# Patient Record
Sex: FEMALE | Race: OTHER | HISPANIC OR LATINO
[De-identification: names, ages, dates, MRNs, and addresses within clinical notes are randomized per-mention and may not be internally consistent; named-entity substitution may affect disease eponyms.]

---

## 2018-12-10 ENCOUNTER — FORM ENCOUNTER (OUTPATIENT)
Age: 63
End: 2018-12-10

## 2018-12-11 ENCOUNTER — OUTPATIENT (OUTPATIENT)
Dept: OUTPATIENT SERVICES | Facility: HOSPITAL | Age: 63
LOS: 1 days | End: 2018-12-11
Payer: COMMERCIAL

## 2018-12-11 ENCOUNTER — APPOINTMENT (OUTPATIENT)
Dept: ORTHOPEDIC SURGERY | Facility: CLINIC | Age: 63
End: 2018-12-11
Payer: MEDICAID

## 2018-12-11 VITALS — OXYGEN SATURATION: 98 % | BODY MASS INDEX: 30.2 KG/M2 | WEIGHT: 140 LBS | HEIGHT: 57 IN | HEART RATE: 72 BPM

## 2018-12-11 VITALS — DIASTOLIC BLOOD PRESSURE: 70 MMHG | SYSTOLIC BLOOD PRESSURE: 124 MMHG

## 2018-12-11 PROCEDURE — 99203 OFFICE O/P NEW LOW 30 MIN: CPT

## 2018-12-11 PROCEDURE — 73564 X-RAY EXAM KNEE 4 OR MORE: CPT | Mod: 26,LT,RT

## 2018-12-11 PROCEDURE — 73502 X-RAY EXAM HIP UNI 2-3 VIEWS: CPT | Mod: 26,LT

## 2018-12-11 PROCEDURE — 72020 X-RAY EXAM OF SPINE 1 VIEW: CPT | Mod: 26

## 2018-12-11 PROCEDURE — 72020 X-RAY EXAM OF SPINE 1 VIEW: CPT

## 2018-12-11 PROCEDURE — 73502 X-RAY EXAM HIP UNI 2-3 VIEWS: CPT

## 2018-12-11 PROCEDURE — 73564 X-RAY EXAM KNEE 4 OR MORE: CPT

## 2019-02-19 ENCOUNTER — FORM ENCOUNTER (OUTPATIENT)
Age: 64
End: 2019-02-19

## 2019-02-20 ENCOUNTER — OUTPATIENT (OUTPATIENT)
Dept: OUTPATIENT SERVICES | Facility: HOSPITAL | Age: 64
LOS: 1 days | End: 2019-02-20
Payer: MEDICAID

## 2019-02-20 ENCOUNTER — OUTPATIENT (OUTPATIENT)
Dept: OUTPATIENT SERVICES | Facility: HOSPITAL | Age: 64
LOS: 1 days | End: 2019-02-20
Payer: COMMERCIAL

## 2019-02-20 DIAGNOSIS — Z22.321 CARRIER OR SUSPECTED CARRIER OF METHICILLIN SUSCEPTIBLE STAPHYLOCOCCUS AUREUS: ICD-10-CM

## 2019-02-20 DIAGNOSIS — M16.12 UNILATERAL PRIMARY OSTEOARTHRITIS, LEFT HIP: ICD-10-CM

## 2019-02-20 LAB
ANION GAP SERPL CALC-SCNC: 12 MMOL/L — SIGNIFICANT CHANGE UP (ref 5–17)
APPEARANCE UR: ABNORMAL
APTT BLD: 30.3 SEC — SIGNIFICANT CHANGE UP (ref 27.5–36.3)
BACTERIA # UR AUTO: PRESENT /HPF
BILIRUB UR-MCNC: NEGATIVE — SIGNIFICANT CHANGE UP
BUN SERPL-MCNC: 18 MG/DL — SIGNIFICANT CHANGE UP (ref 7–23)
CALCIUM SERPL-MCNC: 10.4 MG/DL — SIGNIFICANT CHANGE UP (ref 8.4–10.5)
CHLORIDE SERPL-SCNC: 98 MMOL/L — SIGNIFICANT CHANGE UP (ref 96–108)
CO2 SERPL-SCNC: 28 MMOL/L — SIGNIFICANT CHANGE UP (ref 22–31)
COLOR SPEC: YELLOW — SIGNIFICANT CHANGE UP
CREAT SERPL-MCNC: 1.03 MG/DL — SIGNIFICANT CHANGE UP (ref 0.5–1.3)
DIFF PNL FLD: ABNORMAL
EPI CELLS # UR: SIGNIFICANT CHANGE UP /HPF (ref 0–5)
GLUCOSE SERPL-MCNC: 80 MG/DL — SIGNIFICANT CHANGE UP (ref 70–99)
GLUCOSE UR QL: NEGATIVE — SIGNIFICANT CHANGE UP
HBA1C BLD-MCNC: 5.5 % — SIGNIFICANT CHANGE UP (ref 4–5.6)
HCT VFR BLD CALC: 35.8 % — SIGNIFICANT CHANGE UP (ref 34.5–45)
HGB BLD-MCNC: 11.2 G/DL — LOW (ref 11.5–15.5)
INR BLD: 1.04 — SIGNIFICANT CHANGE UP (ref 0.88–1.16)
KETONES UR-MCNC: 15 MG/DL
LEUKOCYTE ESTERASE UR-ACNC: ABNORMAL
MCHC RBC-ENTMCNC: 30.7 PG — SIGNIFICANT CHANGE UP (ref 27–34)
MCHC RBC-ENTMCNC: 31.3 GM/DL — LOW (ref 32–36)
MCV RBC AUTO: 98.1 FL — SIGNIFICANT CHANGE UP (ref 80–100)
NITRITE UR-MCNC: POSITIVE
NRBC # BLD: 0 /100 WBCS — SIGNIFICANT CHANGE UP (ref 0–0)
PH UR: 6 — SIGNIFICANT CHANGE UP (ref 5–8)
PLATELET # BLD AUTO: 295 K/UL — SIGNIFICANT CHANGE UP (ref 150–400)
POTASSIUM SERPL-MCNC: 4 MMOL/L — SIGNIFICANT CHANGE UP (ref 3.5–5.3)
POTASSIUM SERPL-SCNC: 4 MMOL/L — SIGNIFICANT CHANGE UP (ref 3.5–5.3)
PROT UR-MCNC: 30 MG/DL
PROTHROM AB SERPL-ACNC: 11.8 SEC — SIGNIFICANT CHANGE UP (ref 10–12.9)
RBC # BLD: 3.65 M/UL — LOW (ref 3.8–5.2)
RBC # FLD: 13.4 % — SIGNIFICANT CHANGE UP (ref 10.3–14.5)
RBC CASTS # UR COMP ASSIST: ABNORMAL /HPF
SODIUM SERPL-SCNC: 138 MMOL/L — SIGNIFICANT CHANGE UP (ref 135–145)
SP GR SPEC: 1.01 — SIGNIFICANT CHANGE UP (ref 1–1.03)
UROBILINOGEN FLD QL: 0.2 E.U./DL — SIGNIFICANT CHANGE UP
WBC # BLD: 8.23 K/UL — SIGNIFICANT CHANGE UP (ref 3.8–10.5)
WBC # FLD AUTO: 8.23 K/UL — SIGNIFICANT CHANGE UP (ref 3.8–10.5)
WBC UR QL: ABNORMAL /HPF

## 2019-02-20 PROCEDURE — 93005 ELECTROCARDIOGRAM TRACING: CPT

## 2019-02-20 PROCEDURE — 93010 ELECTROCARDIOGRAM REPORT: CPT

## 2019-02-20 PROCEDURE — 85610 PROTHROMBIN TIME: CPT

## 2019-02-20 PROCEDURE — 83036 HEMOGLOBIN GLYCOSYLATED A1C: CPT

## 2019-02-20 PROCEDURE — 85027 COMPLETE CBC AUTOMATED: CPT

## 2019-02-20 PROCEDURE — 85730 THROMBOPLASTIN TIME PARTIAL: CPT

## 2019-02-20 PROCEDURE — 71046 X-RAY EXAM CHEST 2 VIEWS: CPT | Mod: 26

## 2019-02-20 PROCEDURE — 81001 URINALYSIS AUTO W/SCOPE: CPT

## 2019-02-20 PROCEDURE — 87086 URINE CULTURE/COLONY COUNT: CPT

## 2019-02-20 PROCEDURE — 87186 SC STD MICRODIL/AGAR DIL: CPT

## 2019-02-20 PROCEDURE — 87641 MR-STAPH DNA AMP PROBE: CPT

## 2019-02-20 PROCEDURE — 71046 X-RAY EXAM CHEST 2 VIEWS: CPT

## 2019-02-20 PROCEDURE — 80048 BASIC METABOLIC PNL TOTAL CA: CPT

## 2019-02-21 LAB
MRSA PCR RESULT.: NEGATIVE — SIGNIFICANT CHANGE UP
S AUREUS DNA NOSE QL NAA+PROBE: POSITIVE

## 2019-02-22 LAB
-  AMPICILLIN/SULBACTAM: SIGNIFICANT CHANGE UP
-  AMPICILLIN: SIGNIFICANT CHANGE UP
-  CEFTRIAXONE: SIGNIFICANT CHANGE UP
-  CIPROFLOXACIN: SIGNIFICANT CHANGE UP
-  GENTAMICIN: SIGNIFICANT CHANGE UP
-  NITROFURANTOIN: SIGNIFICANT CHANGE UP
-  PIPERACILLIN/TAZOBACTAM: SIGNIFICANT CHANGE UP
-  TOBRAMYCIN: SIGNIFICANT CHANGE UP
-  TRIMETHOPRIM/SULFAMETHOXAZOLE: SIGNIFICANT CHANGE UP
METHOD TYPE: SIGNIFICANT CHANGE UP

## 2019-02-23 LAB
ANABASINE UR-MCNC: <2 NG/ML — SIGNIFICANT CHANGE UP
COTININE UR-MCNC: <5 NG/ML — SIGNIFICANT CHANGE UP
CULTURE RESULTS: SIGNIFICANT CHANGE UP
NICOTINE UR-MCNC: <5 NG/ML — SIGNIFICANT CHANGE UP
NORNICOTINE UR-MCNC: <2 NG/ML — SIGNIFICANT CHANGE UP
ORGANISM # SPEC MICROSCOPIC CNT: SIGNIFICANT CHANGE UP
ORGANISM # SPEC MICROSCOPIC CNT: SIGNIFICANT CHANGE UP
SPECIMEN SOURCE: SIGNIFICANT CHANGE UP

## 2019-03-13 ENCOUNTER — LABORATORY RESULT (OUTPATIENT)
Age: 64
End: 2019-03-13

## 2019-03-13 ENCOUNTER — APPOINTMENT (OUTPATIENT)
Dept: INTERNAL MEDICINE | Facility: CLINIC | Age: 64
End: 2019-03-13
Payer: MEDICAID

## 2019-03-13 VITALS
HEART RATE: 71 BPM | SYSTOLIC BLOOD PRESSURE: 110 MMHG | OXYGEN SATURATION: 98 % | WEIGHT: 135 LBS | DIASTOLIC BLOOD PRESSURE: 60 MMHG | BODY MASS INDEX: 29.12 KG/M2 | TEMPERATURE: 98.6 F | HEIGHT: 57 IN

## 2019-03-13 DIAGNOSIS — I10 ESSENTIAL (PRIMARY) HYPERTENSION: ICD-10-CM

## 2019-03-13 DIAGNOSIS — M81.0 AGE-RELATED OSTEOPOROSIS W/OUT CURRENT PATHOLOGICAL FRACTURE: ICD-10-CM

## 2019-03-13 PROCEDURE — 99204 OFFICE O/P NEW MOD 45 MIN: CPT

## 2019-03-13 NOTE — PHYSICAL EXAM
[No Acute Distress] : no acute distress [Well Nourished] : well nourished [Well Developed] : well developed [Normal Sclera/Conjunctiva] : normal sclera/conjunctiva [Normal Oropharynx] : the oropharynx was normal [No JVD] : no jugular venous distention [Supple] : supple [No Lymphadenopathy] : no lymphadenopathy [No Respiratory Distress] : no respiratory distress  [Clear to Auscultation] : lungs were clear to auscultation bilaterally [No Accessory Muscle Use] : no accessory muscle use [Normal Rate] : normal rate  [Regular Rhythm] : with a regular rhythm [Normal S1, S2] : normal S1 and S2 [No Murmur] : no murmur heard [No Edema] : there was no peripheral edema [Soft] : abdomen soft [Normal Affect] : the affect was normal [Normal Insight/Judgement] : insight and judgment were intact

## 2019-03-13 NOTE — HISTORY OF PRESENT ILLNESS
[No Pertinent Cardiac History] : no history of aortic stenosis, atrial fibrillation, coronary artery disease, recent myocardial infarction, or implantable device/pacemaker [No Pertinent Pulmonary History] : no history of asthma, COPD, sleep apnea, or smoking [FreeTextEntry1] : Left THR [FreeTextEntry2] : 3/20/19 [FreeTextEntry3] : Dr Puckett [FreeTextEntry4] : THis is a [place]ian speaking f with severe Left hip pain. Denies trauma.  \par Atenolol 25mg, HCTZ 25 mg, \par tramadol for pain\par Has osteoporosis and takes alendronate.  \par Had a UTI recently and treated by her PMD - ? abx name - took it for 10 days

## 2019-03-17 LAB
APPEARANCE: ABNORMAL
BILIRUBIN URINE: NEGATIVE
BLOOD URINE: ABNORMAL
COLOR: YELLOW
GLUCOSE QUALITATIVE U: NEGATIVE
KETONES URINE: NEGATIVE
LEUKOCYTE ESTERASE URINE: ABNORMAL
NITRITE URINE: NEGATIVE
PH URINE: 6
PROTEIN URINE: ABNORMAL
SPECIFIC GRAVITY URINE: 1.02
UROBILINOGEN URINE: NORMAL

## 2019-03-19 VITALS
HEIGHT: 57 IN | OXYGEN SATURATION: 99 % | HEART RATE: 79 BPM | DIASTOLIC BLOOD PRESSURE: 67 MMHG | RESPIRATION RATE: 18 BRPM | SYSTOLIC BLOOD PRESSURE: 120 MMHG | TEMPERATURE: 98 F | WEIGHT: 134.48 LBS

## 2019-03-19 NOTE — H&P ADULT - HISTORY OF PRESENT ILLNESS
63F c/o left hip pain  Presents today for elective left anterior THR. 63F c/o left hip pain for numerous years that is worsening. Pt has difficulty ambulating and uses a cane. Patient has tried PT and pain meds without relief. Denies any CP, SOB, fever, chills, numbness/tingling.   Presents today for elective left anterior THR.

## 2019-03-19 NOTE — H&P ADULT - NSICDXPROBLEM_GEN_ALL_CORE_FT
PROBLEM DIAGNOSES  Problem: OA (osteoarthritis)  Assessment and Plan: Admit to ortho, for Left anterior THR, cleared by Dr. Hernandez

## 2019-03-19 NOTE — H&P ADULT - NSHPPHYSICALEXAM_GEN_ALL_CORE
Left hip decreased rom 2/2 pain  Rest of PE per MD clearance Left hip decreased rom 2/2 pain  TTP of left hip  Sensation intact to light touch  Muscles strength 5/5 to EHL/TA/GS  Pedal pulse palpable  Compartments are soft and compressible b/l, nonTTP  Rest of PE per MD clearance

## 2019-03-19 NOTE — H&P ADULT - NSHPLABSRESULTS_GEN_ALL_CORE
Preop cbc, bmp, pt/inr, ptt wnl; nasal swab +MSSA  +UA/Cx repeat dos  preop cxr wnl per clearance  preop ekg wnl per clearance Preop cbc, bmp, pt/inr, ptt wnl; nasal swab +MSSA  +UA/Cx repeat dos - patient was treated with antibiotics  preop cxr wnl per clearance  preop ekg wnl per clearance

## 2019-03-19 NOTE — PATIENT PROFILE ADULT - NSASFUNCLEVELADLAMBULATE_GEN_A_NUR
Laura see below about the [place]  S/w Kathie Arriola, she did not send anything about the % of relief for the auth  Let pt know  1 = assistive equipment/cane

## 2019-03-19 NOTE — PATIENT PROFILE ADULT - NSPROEDALEARNPREF_GEN_A_NUR
individual instruction written material/verbal instruction/individual instruction/skill demonstration

## 2019-03-20 ENCOUNTER — APPOINTMENT (OUTPATIENT)
Dept: ORTHOPEDIC SURGERY | Facility: HOSPITAL | Age: 64
End: 2019-03-20

## 2019-03-20 ENCOUNTER — INPATIENT (INPATIENT)
Facility: HOSPITAL | Age: 64
LOS: 1 days | Discharge: HOME HEALTH | DRG: 470 | End: 2019-03-22
Attending: ORTHOPAEDIC SURGERY | Admitting: ORTHOPAEDIC SURGERY
Payer: MEDICAID

## 2019-03-20 ENCOUNTER — MEDICATION RENEWAL (OUTPATIENT)
Age: 64
End: 2019-03-20

## 2019-03-20 ENCOUNTER — RESULT REVIEW (OUTPATIENT)
Age: 64
End: 2019-03-20

## 2019-03-20 DIAGNOSIS — I10 ESSENTIAL (PRIMARY) HYPERTENSION: ICD-10-CM

## 2019-03-20 DIAGNOSIS — M19.90 UNSPECIFIED OSTEOARTHRITIS, UNSPECIFIED SITE: ICD-10-CM

## 2019-03-20 DIAGNOSIS — Z98.891 HISTORY OF UTERINE SCAR FROM PREVIOUS SURGERY: Chronic | ICD-10-CM

## 2019-03-20 LAB
ALBUMIN SERPL ELPH-MCNC: 4 G/DL — SIGNIFICANT CHANGE UP (ref 3.3–5)
ALP SERPL-CCNC: 89 U/L — SIGNIFICANT CHANGE UP (ref 40–120)
ALT FLD-CCNC: 16 U/L — SIGNIFICANT CHANGE UP (ref 10–45)
ANION GAP SERPL CALC-SCNC: 9 MMOL/L — SIGNIFICANT CHANGE UP (ref 5–17)
APPEARANCE UR: ABNORMAL
AST SERPL-CCNC: 27 U/L — SIGNIFICANT CHANGE UP (ref 10–40)
BASOPHILS # BLD AUTO: 0.02 K/UL — SIGNIFICANT CHANGE UP (ref 0–0.2)
BASOPHILS NFR BLD AUTO: 0.3 % — SIGNIFICANT CHANGE UP (ref 0–2)
BILIRUB SERPL-MCNC: 0.2 MG/DL — SIGNIFICANT CHANGE UP (ref 0.2–1.2)
BILIRUB UR-MCNC: NEGATIVE — SIGNIFICANT CHANGE UP
BUN SERPL-MCNC: 11 MG/DL — SIGNIFICANT CHANGE UP (ref 7–23)
CALCIUM SERPL-MCNC: 9.9 MG/DL — SIGNIFICANT CHANGE UP (ref 8.4–10.5)
CHLORIDE SERPL-SCNC: 102 MMOL/L — SIGNIFICANT CHANGE UP (ref 96–108)
CO2 SERPL-SCNC: 25 MMOL/L — SIGNIFICANT CHANGE UP (ref 22–31)
COLOR SPEC: YELLOW — SIGNIFICANT CHANGE UP
CREAT SERPL-MCNC: 0.63 MG/DL — SIGNIFICANT CHANGE UP (ref 0.5–1.3)
DIFF PNL FLD: NEGATIVE — SIGNIFICANT CHANGE UP
EOSINOPHIL # BLD AUTO: 0.01 K/UL — SIGNIFICANT CHANGE UP (ref 0–0.5)
EOSINOPHIL NFR BLD AUTO: 0.1 % — SIGNIFICANT CHANGE UP (ref 0–6)
GLUCOSE SERPL-MCNC: 132 MG/DL — HIGH (ref 70–99)
GLUCOSE UR QL: NEGATIVE — SIGNIFICANT CHANGE UP
HCT VFR BLD CALC: 33.6 % — LOW (ref 34.5–45)
HGB BLD-MCNC: 10.5 G/DL — LOW (ref 11.5–15.5)
IMM GRANULOCYTES NFR BLD AUTO: 0.5 % — SIGNIFICANT CHANGE UP (ref 0–1.5)
KETONES UR-MCNC: NEGATIVE — SIGNIFICANT CHANGE UP
LEUKOCYTE ESTERASE UR-ACNC: ABNORMAL
LYMPHOCYTES # BLD AUTO: 0.57 K/UL — LOW (ref 1–3.3)
LYMPHOCYTES # BLD AUTO: 7.5 % — LOW (ref 13–44)
MCHC RBC-ENTMCNC: 30.7 PG — SIGNIFICANT CHANGE UP (ref 27–34)
MCHC RBC-ENTMCNC: 31.3 GM/DL — LOW (ref 32–36)
MCV RBC AUTO: 98.2 FL — SIGNIFICANT CHANGE UP (ref 80–100)
MONOCYTES # BLD AUTO: 0.18 K/UL — SIGNIFICANT CHANGE UP (ref 0–0.9)
MONOCYTES NFR BLD AUTO: 2.4 % — SIGNIFICANT CHANGE UP (ref 2–14)
NEUTROPHILS # BLD AUTO: 6.74 K/UL — SIGNIFICANT CHANGE UP (ref 1.8–7.4)
NEUTROPHILS NFR BLD AUTO: 89.2 % — HIGH (ref 43–77)
NITRITE UR-MCNC: POSITIVE
NRBC # BLD: 0 /100 WBCS — SIGNIFICANT CHANGE UP (ref 0–0)
PH UR: 6 — SIGNIFICANT CHANGE UP (ref 5–8)
PLATELET # BLD AUTO: 238 K/UL — SIGNIFICANT CHANGE UP (ref 150–400)
POTASSIUM SERPL-MCNC: 4.4 MMOL/L — SIGNIFICANT CHANGE UP (ref 3.5–5.3)
POTASSIUM SERPL-SCNC: 4.4 MMOL/L — SIGNIFICANT CHANGE UP (ref 3.5–5.3)
PROT SERPL-MCNC: 7.2 G/DL — SIGNIFICANT CHANGE UP (ref 6–8.3)
PROT UR-MCNC: NEGATIVE MG/DL — SIGNIFICANT CHANGE UP
RBC # BLD: 3.42 M/UL — LOW (ref 3.8–5.2)
RBC # FLD: 14.6 % — HIGH (ref 10.3–14.5)
SODIUM SERPL-SCNC: 136 MMOL/L — SIGNIFICANT CHANGE UP (ref 135–145)
SP GR SPEC: 1.02 — SIGNIFICANT CHANGE UP (ref 1–1.03)
UROBILINOGEN FLD QL: 0.2 E.U./DL — SIGNIFICANT CHANGE UP
WBC # BLD: 7.56 K/UL — SIGNIFICANT CHANGE UP (ref 3.8–10.5)
WBC # FLD AUTO: 7.56 K/UL — SIGNIFICANT CHANGE UP (ref 3.8–10.5)

## 2019-03-20 PROCEDURE — 27130 TOTAL HIP ARTHROPLASTY: CPT | Mod: LT

## 2019-03-20 PROCEDURE — 72170 X-RAY EXAM OF PELVIS: CPT | Mod: 26

## 2019-03-20 RX ORDER — KETOROLAC TROMETHAMINE 30 MG/ML
15 SYRINGE (ML) INJECTION ONCE
Qty: 0 | Refills: 0 | Status: DISCONTINUED | OUTPATIENT
Start: 2019-03-20 | End: 2019-03-22

## 2019-03-20 RX ORDER — ACETAMINOPHEN 500 MG
650 TABLET ORAL EVERY 6 HOURS
Qty: 0 | Refills: 0 | Status: DISCONTINUED | OUTPATIENT
Start: 2019-03-20 | End: 2019-03-22

## 2019-03-20 RX ORDER — ASPIRIN/CALCIUM CARB/MAGNESIUM 324 MG
325 TABLET ORAL
Qty: 0 | Refills: 0 | Status: DISCONTINUED | OUTPATIENT
Start: 2019-03-20 | End: 2019-03-22

## 2019-03-20 RX ORDER — CELECOXIB 200 MG/1
400 CAPSULE ORAL ONCE
Qty: 0 | Refills: 0 | Status: COMPLETED | OUTPATIENT
Start: 2019-03-20 | End: 2019-03-20

## 2019-03-20 RX ORDER — CEFAZOLIN SODIUM 1 G
2000 VIAL (EA) INJECTION EVERY 8 HOURS
Qty: 0 | Refills: 0 | Status: COMPLETED | OUTPATIENT
Start: 2019-03-20 | End: 2019-03-21

## 2019-03-20 RX ORDER — SENNA PLUS 8.6 MG/1
2 TABLET ORAL AT BEDTIME
Qty: 0 | Refills: 0 | Status: DISCONTINUED | OUTPATIENT
Start: 2019-03-20 | End: 2019-03-22

## 2019-03-20 RX ORDER — DOCUSATE SODIUM 100 MG
100 CAPSULE ORAL THREE TIMES A DAY
Qty: 0 | Refills: 0 | Status: DISCONTINUED | OUTPATIENT
Start: 2019-03-20 | End: 2019-03-22

## 2019-03-20 RX ORDER — PANTOPRAZOLE SODIUM 20 MG/1
40 TABLET, DELAYED RELEASE ORAL
Qty: 0 | Refills: 0 | Status: DISCONTINUED | OUTPATIENT
Start: 2019-03-20 | End: 2019-03-22

## 2019-03-20 RX ORDER — SODIUM CHLORIDE 9 MG/ML
1000 INJECTION, SOLUTION INTRAVENOUS
Qty: 0 | Refills: 0 | Status: DISCONTINUED | OUTPATIENT
Start: 2019-03-20 | End: 2019-03-22

## 2019-03-20 RX ORDER — CEFAZOLIN SODIUM 1 G
2000 VIAL (EA) INJECTION EVERY 8 HOURS
Qty: 0 | Refills: 0 | Status: DISCONTINUED | OUTPATIENT
Start: 2019-03-20 | End: 2019-03-20

## 2019-03-20 RX ORDER — ONDANSETRON 8 MG/1
4 TABLET, FILM COATED ORAL EVERY 6 HOURS
Qty: 0 | Refills: 0 | Status: DISCONTINUED | OUTPATIENT
Start: 2019-03-20 | End: 2019-03-22

## 2019-03-20 RX ORDER — MORPHINE SULFATE 50 MG/1
2 CAPSULE, EXTENDED RELEASE ORAL
Qty: 0 | Refills: 0 | Status: DISCONTINUED | OUTPATIENT
Start: 2019-03-20 | End: 2019-03-22

## 2019-03-20 RX ORDER — BUPIVACAINE 13.3 MG/ML
20 INJECTION, SUSPENSION, LIPOSOMAL INFILTRATION ONCE
Qty: 0 | Refills: 0 | Status: DISCONTINUED | OUTPATIENT
Start: 2019-03-20 | End: 2019-03-22

## 2019-03-20 RX ORDER — ATENOLOL 25 MG/1
25 TABLET ORAL DAILY
Qty: 0 | Refills: 0 | Status: DISCONTINUED | OUTPATIENT
Start: 2019-03-20 | End: 2019-03-22

## 2019-03-20 RX ORDER — HYDROCHLOROTHIAZIDE 25 MG
25 TABLET ORAL DAILY
Qty: 0 | Refills: 0 | Status: DISCONTINUED | OUTPATIENT
Start: 2019-03-20 | End: 2019-03-22

## 2019-03-20 RX ORDER — APREPITANT 80 MG/1
40 CAPSULE ORAL ONCE
Qty: 0 | Refills: 0 | Status: COMPLETED | OUTPATIENT
Start: 2019-03-20 | End: 2019-03-20

## 2019-03-20 RX ORDER — POLYETHYLENE GLYCOL 3350 17 G/17G
17 POWDER, FOR SOLUTION ORAL DAILY
Qty: 0 | Refills: 0 | Status: DISCONTINUED | OUTPATIENT
Start: 2019-03-20 | End: 2019-03-22

## 2019-03-20 RX ORDER — OXYCODONE HYDROCHLORIDE 5 MG/1
10 TABLET ORAL EVERY 4 HOURS
Qty: 0 | Refills: 0 | Status: DISCONTINUED | OUTPATIENT
Start: 2019-03-20 | End: 2019-03-22

## 2019-03-20 RX ORDER — ACETAMINOPHEN 500 MG
1000 TABLET ORAL ONCE
Qty: 0 | Refills: 0 | Status: COMPLETED | OUTPATIENT
Start: 2019-03-20 | End: 2019-03-20

## 2019-03-20 RX ORDER — CELECOXIB 200 MG/1
200 CAPSULE ORAL
Qty: 0 | Refills: 0 | Status: DISCONTINUED | OUTPATIENT
Start: 2019-03-20 | End: 2019-03-22

## 2019-03-20 RX ORDER — OXYCODONE HYDROCHLORIDE 5 MG/1
5 TABLET ORAL EVERY 4 HOURS
Qty: 0 | Refills: 0 | Status: DISCONTINUED | OUTPATIENT
Start: 2019-03-20 | End: 2019-03-22

## 2019-03-20 RX ORDER — MORPHINE SULFATE 50 MG/1
4 CAPSULE, EXTENDED RELEASE ORAL
Qty: 0 | Refills: 0 | Status: DISCONTINUED | OUTPATIENT
Start: 2019-03-20 | End: 2019-03-22

## 2019-03-20 RX ORDER — MAGNESIUM HYDROXIDE 400 MG/1
30 TABLET, CHEWABLE ORAL DAILY
Qty: 0 | Refills: 0 | Status: DISCONTINUED | OUTPATIENT
Start: 2019-03-20 | End: 2019-03-22

## 2019-03-20 RX ADMIN — Medication 325 MILLIGRAM(S): at 17:38

## 2019-03-20 RX ADMIN — OXYCODONE HYDROCHLORIDE 10 MILLIGRAM(S): 5 TABLET ORAL at 22:38

## 2019-03-20 RX ADMIN — Medication 2000 MILLIGRAM(S): at 18:04

## 2019-03-20 RX ADMIN — OXYCODONE HYDROCHLORIDE 5 MILLIGRAM(S): 5 TABLET ORAL at 17:39

## 2019-03-20 RX ADMIN — Medication 1000 MILLIGRAM(S): at 08:21

## 2019-03-20 RX ADMIN — APREPITANT 40 MILLIGRAM(S): 80 CAPSULE ORAL at 08:20

## 2019-03-20 RX ADMIN — Medication 650 MILLIGRAM(S): at 18:40

## 2019-03-20 RX ADMIN — Medication 100 MILLIGRAM(S): at 21:38

## 2019-03-20 RX ADMIN — CELECOXIB 400 MILLIGRAM(S): 200 CAPSULE ORAL at 08:21

## 2019-03-20 RX ADMIN — Medication 650 MILLIGRAM(S): at 23:51

## 2019-03-20 RX ADMIN — OXYCODONE HYDROCHLORIDE 10 MILLIGRAM(S): 5 TABLET ORAL at 21:38

## 2019-03-20 RX ADMIN — OXYCODONE HYDROCHLORIDE 5 MILLIGRAM(S): 5 TABLET ORAL at 18:39

## 2019-03-20 RX ADMIN — Medication 650 MILLIGRAM(S): at 18:00

## 2019-03-20 NOTE — PROGRESS NOTE ADULT - SUBJECTIVE AND OBJECTIVE BOX
Orthopaedics Post Op Check    Procedure: left total hip replacement   Surgeon: Dr. Puckett     Pt comfortable, without complaints  Denies CP, SOB, N/V, numbness/tingling     Vital Signs Last 24 Hrs  T(C): 36.3 (20 Mar 2019 14:10), Max: 36.3 (20 Mar 2019 12:25)  T(F): 97.4 (20 Mar 2019 14:10), Max: 97.4 (20 Mar 2019 12:25)  HR: 56 (20 Mar 2019 15:10) (52 - 62)  BP: 101/58 (20 Mar 2019 15:10) (101/53 - 120/58)  BP(mean): 76 (20 Mar 2019 15:10) (74 - 82)  RR: 14 (20 Mar 2019 15:10) (12 - 20)  SpO2: 98% (20 Mar 2019 15:10) (98% - 100%)  AVSS, NAD    Dressing C/D/I  General: Pt Alert and oriented     Pulses: DP/PT pulses 2+  Sensation: SLT in tact and equal to distal bilateral lower extremities   Motor: EHL/FHL/TA/GS 5/5 motor strength bilaterally                           10.5   7.56  )-----------( 238      ( 20 Mar 2019 13:09 )             33.6   03-20    136  |  102  |  11  ----------------------------<  132<H>  4.4   |  25  |  0.63    Ca    9.9      20 Mar 2019 13:08    TPro  7.2  /  Alb  4.0  /  TBili  0.2  /  DBili  x   /  AST  27  /  ALT  16  /  AlkPhos  89  03-20    Post op XR: left hip prosthesis in place     A/P: 63yFemale POD#0 s/p left THR  - Stable  - Pain Control  - DVT ppx:  bid   - Post op abx: Ancef  - PT, WBS: WBAT  - F/U AM Labs

## 2019-03-20 NOTE — PHYSICAL THERAPY INITIAL EVALUATION ADULT - CRITERIA FOR SKILLED THERAPEUTIC INTERVENTIONS
anticipated equipment needs at discharge/therapy frequency/rehab potential/anticipated discharge recommendation/impairments found

## 2019-03-20 NOTE — PHYSICAL THERAPY INITIAL EVALUATION ADULT - ADDITIONAL COMMENTS
Pt lives in an apt, 6 steps to enter to elevator. Pt has family support to assist. Pt owns a cane and used a cane prior to surgery

## 2019-03-21 ENCOUNTER — TRANSCRIPTION ENCOUNTER (OUTPATIENT)
Age: 64
End: 2019-03-21

## 2019-03-21 LAB
ANION GAP SERPL CALC-SCNC: 10 MMOL/L — SIGNIFICANT CHANGE UP (ref 5–17)
BUN SERPL-MCNC: 10 MG/DL — SIGNIFICANT CHANGE UP (ref 7–23)
CALCIUM SERPL-MCNC: 9.2 MG/DL — SIGNIFICANT CHANGE UP (ref 8.4–10.5)
CHLORIDE SERPL-SCNC: 105 MMOL/L — SIGNIFICANT CHANGE UP (ref 96–108)
CO2 SERPL-SCNC: 25 MMOL/L — SIGNIFICANT CHANGE UP (ref 22–31)
CREAT SERPL-MCNC: 0.57 MG/DL — SIGNIFICANT CHANGE UP (ref 0.5–1.3)
GLUCOSE SERPL-MCNC: 175 MG/DL — HIGH (ref 70–99)
HCT VFR BLD CALC: 27.5 % — LOW (ref 34.5–45)
HGB BLD-MCNC: 8.7 G/DL — LOW (ref 11.5–15.5)
MCHC RBC-ENTMCNC: 30.9 PG — SIGNIFICANT CHANGE UP (ref 27–34)
MCHC RBC-ENTMCNC: 31.6 GM/DL — LOW (ref 32–36)
MCV RBC AUTO: 97.5 FL — SIGNIFICANT CHANGE UP (ref 80–100)
NRBC # BLD: 0 /100 WBCS — SIGNIFICANT CHANGE UP (ref 0–0)
PLATELET # BLD AUTO: 204 K/UL — SIGNIFICANT CHANGE UP (ref 150–400)
POTASSIUM SERPL-MCNC: 3.8 MMOL/L — SIGNIFICANT CHANGE UP (ref 3.5–5.3)
POTASSIUM SERPL-SCNC: 3.8 MMOL/L — SIGNIFICANT CHANGE UP (ref 3.5–5.3)
RBC # BLD: 2.82 M/UL — LOW (ref 3.8–5.2)
RBC # FLD: 14.7 % — HIGH (ref 10.3–14.5)
SODIUM SERPL-SCNC: 140 MMOL/L — SIGNIFICANT CHANGE UP (ref 135–145)
WBC # BLD: 8.54 K/UL — SIGNIFICANT CHANGE UP (ref 3.8–10.5)
WBC # FLD AUTO: 8.54 K/UL — SIGNIFICANT CHANGE UP (ref 3.8–10.5)

## 2019-03-21 PROCEDURE — 99221 1ST HOSP IP/OBS SF/LOW 40: CPT

## 2019-03-21 RX ADMIN — OXYCODONE HYDROCHLORIDE 10 MILLIGRAM(S): 5 TABLET ORAL at 16:10

## 2019-03-21 RX ADMIN — OXYCODONE HYDROCHLORIDE 10 MILLIGRAM(S): 5 TABLET ORAL at 22:15

## 2019-03-21 RX ADMIN — Medication 100 MILLIGRAM(S): at 21:14

## 2019-03-21 RX ADMIN — OXYCODONE HYDROCHLORIDE 10 MILLIGRAM(S): 5 TABLET ORAL at 21:15

## 2019-03-21 RX ADMIN — Medication 650 MILLIGRAM(S): at 11:51

## 2019-03-21 RX ADMIN — OXYCODONE HYDROCHLORIDE 10 MILLIGRAM(S): 5 TABLET ORAL at 11:52

## 2019-03-21 RX ADMIN — Medication 650 MILLIGRAM(S): at 00:51

## 2019-03-21 RX ADMIN — OXYCODONE HYDROCHLORIDE 10 MILLIGRAM(S): 5 TABLET ORAL at 15:53

## 2019-03-21 RX ADMIN — Medication 325 MILLIGRAM(S): at 05:40

## 2019-03-21 RX ADMIN — Medication 650 MILLIGRAM(S): at 06:40

## 2019-03-21 RX ADMIN — Medication 30 MILLILITER(S): at 18:55

## 2019-03-21 RX ADMIN — Medication 100 MILLIGRAM(S): at 05:40

## 2019-03-21 RX ADMIN — Medication 650 MILLIGRAM(S): at 12:33

## 2019-03-21 RX ADMIN — Medication 2000 MILLIGRAM(S): at 02:23

## 2019-03-21 RX ADMIN — Medication 650 MILLIGRAM(S): at 17:45

## 2019-03-21 RX ADMIN — Medication 30 MILLILITER(S): at 15:32

## 2019-03-21 RX ADMIN — Medication 325 MILLIGRAM(S): at 17:18

## 2019-03-21 RX ADMIN — Medication 650 MILLIGRAM(S): at 17:59

## 2019-03-21 RX ADMIN — POLYETHYLENE GLYCOL 3350 17 GRAM(S): 17 POWDER, FOR SOLUTION ORAL at 11:52

## 2019-03-21 RX ADMIN — PANTOPRAZOLE SODIUM 40 MILLIGRAM(S): 20 TABLET, DELAYED RELEASE ORAL at 05:40

## 2019-03-21 RX ADMIN — OXYCODONE HYDROCHLORIDE 10 MILLIGRAM(S): 5 TABLET ORAL at 12:58

## 2019-03-21 RX ADMIN — Medication 650 MILLIGRAM(S): at 23:10

## 2019-03-21 RX ADMIN — Medication 30 MILLILITER(S): at 02:37

## 2019-03-21 RX ADMIN — Medication 650 MILLIGRAM(S): at 05:40

## 2019-03-21 RX ADMIN — Medication 650 MILLIGRAM(S): at 23:55

## 2019-03-21 NOTE — CONSULT NOTE ADULT - ASSESSMENT
62 yo f with hx of HTN, Osteoporosis, s/p Left THR    1) Anemia- post op - follow trend  2) HTN- BP low overnight, Atenolol and HCTZ - Hold SBP <110  3) DVT ppx with asa bid dosing

## 2019-03-21 NOTE — CONSULT NOTE ADULT - SUBJECTIVE AND OBJECTIVE BOX
Patient seen and examined, Chart reviewed    HPI:  62 yo Micronesian speaking female with hx of HTN, OA, with c/o left hip pain for numerous years that is worsening. Pt has difficulty ambulating and uses a cane. Patient has tried PT and pain meds without relief. Denies any CP, SOB, fever, chills, numbness/tingling.   She is today POD #1 from an elective left THR.       PAST MEDICAL & SURGICAL HISTORY:  HTN (hypertension)  OA (osteoarthritis)  S/P       REVIEW OF SYSTEMS:  CONSTITUTIONAL:  No night sweats.  Some fatigue,  No fever or chills.  HEENT:  Eyes:  No visual changes.    RESPIRATORY:  No cough.  No wheeze.    No shortness of breath.  CARDIOVASCULAR:  No chest pains.  No palpitations.  GASTROINTESTINAL:  No abdominal pain.  No nausea or vomiting.  No diarrhea    GENITOURINARY:  No urgency.  No frequency.  No dysuria.  No hematuria.    MUSCULOSKELETAL:  left hip pain present   SKIN:  No rashes.      acetaminophen   Tablet .. 650 milliGRAM(s) Oral every 6 hours  aluminum hydroxide/magnesium hydroxide/simethicone Suspension 30 milliLiter(s) Oral four times a day PRN  aspirin enteric coated 325 milliGRAM(s) Oral two times a day  ATENolol  Tablet 25 milliGRAM(s) Oral daily  BUpivacaine liposome 1.3% Injectable (no eMAR) 20 milliLiter(s) Local Injection once  celecoxib 200 milliGRAM(s) Oral two times a day  docusate sodium 100 milliGRAM(s) Oral three times a day  hydrochlorothiazide 25 milliGRAM(s) Oral daily  ketorolac   Injectable 15 milliGRAM(s) IV Push once  lactated ringers. 1000 milliLiter(s) IV Continuous <Continuous>  magnesium hydroxide Suspension 30 milliLiter(s) Oral daily PRN  morphine  - Injectable 2 milliGRAM(s) IV Push every 3 hours PRN  morphine  - Injectable 4 milliGRAM(s) IV Push every 15 minutes  ondansetron Injectable 4 milliGRAM(s) IV Push every 6 hours PRN  oxyCODONE    IR 5 milliGRAM(s) Oral every 4 hours PRN  oxyCODONE    IR 10 milliGRAM(s) Oral every 4 hours PRN  pantoprazole    Tablet 40 milliGRAM(s) Oral before breakfast  polyethylene glycol 3350 17 Gram(s) Oral daily  senna 2 Tablet(s) Oral at bedtime PRN      Allergies  No Known Allergies      SOCIAL HISTORY: no tob    FAMILY HISTORY:  nc    Vital Signs Last 24 Hrs  T(C): 36.8 (21 Mar 2019 05:02), Max: 37.6 (20 Mar 2019 20:09)  T(F): 98.2 (21 Mar 2019 05:02), Max: 99.6 (20 Mar 2019 20:09)  HR: 74 (21 Mar 2019 05:02) (52 - 74)  BP: 96/50 (21 Mar 2019 05:02) (96/50 - 120/58)  BP(mean): 76 (20 Mar 2019 15:10) (74 - 82)  RR: 17 (21 Mar 2019 05:02) (12 - 20)  SpO2: 99% (21 Mar 2019 05:02) (98% - 100%)     @ 07:01  -  03-21 @ 06:36  --------------------------------------------------------  IN: 1740 mL / OUT: 550 mL / NET: 1190 mL        PHYSICAL EXAM:   General - NAD, Alert and oriented x 3,   Neck - - No lymphadenopathy  Cardiovascular - RRR no m/r/g, no JVD  Lungs - Clear to auscultation, no use of accessory muscles, no crackles or wheezes.  Skin - No rashes, skin warm and dry, no erythematous areas  Abdomen - Normal bowel sounds, abdomen soft and nontender  Extremeties - No edema,   Neurological – no focal deficits      LABS:                        8.7    8.54  )-----------( 204      ( 21 Mar 2019 06:12 )             27.5     03-20    136  |  102  |  11  ----------------------------<  132<H>  4.4   |  25  |  0.63    Ca    9.9      20 Mar 2019 13:08    TPro  7.2  /  Alb  4.0  /  TBili  0.2  /  DBili  x   /  AST  27  /  ALT  16  /  AlkPhos  89  03-20      Urinalysis Basic - ( 20 Mar 2019 10:03 )    Color: x / Appearance: x / SG: x / pH: x  Gluc: x / Ketone: x  / Bili: x / Urobili: x   Blood: x / Protein: x / Nitrite: x   Leuk Esterase: x / RBC: < 5 /HPF / WBC > 10 /HPF   Sq Epi: x / Non Sq Epi: 5-10 /HPF / Bacteria: Many /HPF        RADIOLOGY & ADDITIONAL STUDIES:

## 2019-03-21 NOTE — PROGRESS NOTE ADULT - SUBJECTIVE AND OBJECTIVE BOX
POST OPERATIVE DAY #:   STATUS POST: Left THR                        SUBJECTIVE: Patient seen and examined. Pt. states she is doing well and was able to walk a little with PT yesterday. Pt. was asked if she had any burning/frequency with urination and denies. Pt. reports she did a course of abx with PMD for UTI.   Denies any sob/cp/n/v/numbness or tingling in b/l in les.     OBJECTIVE:     Vital Signs Last 24 Hrs  T(C): 36.7 (21 Mar 2019 08:24), Max: 37.6 (20 Mar 2019 20:09)  T(F): 98 (21 Mar 2019 08:24), Max: 99.6 (20 Mar 2019 20:09)  HR: 75 (21 Mar 2019 08:24) (56 - 75)  BP: 104/55 (21 Mar 2019 08:24) (96/50 - 115/58)  BP(mean): 76 (20 Mar 2019 15:10) (75 - 80)  RR: 16 (21 Mar 2019 08:24) (14 - 17)  SpO2: 99% (21 Mar 2019 08:24) (98% - 100%)    Affected extremity: left le          Dressing: clean/dry/intact          Sensation: intact to light touch to patient's baseline         Motor exam: EHL/TA/GS 5/5  Pulses 2+ b/l les              I&O's Detail    20 Mar 2019 07:01  -  21 Mar 2019 07:00  --------------------------------------------------------  IN:    lactated ringers.: 1400 mL    Oral Fluid: 340 mL  Total IN: 1740 mL    OUT:    Voided: 550 mL  Total OUT: 550 mL    Total NET: 1190 mL      21 Mar 2019 07:01  -  21 Mar 2019 13:40  --------------------------------------------------------  IN:    Oral Fluid: 360 mL  Total IN: 360 mL    OUT:    Voided: 200 mL  Total OUT: 200 mL    Total NET: 160 mL          LABS:                        8.7    8.54  )-----------( 204      ( 21 Mar 2019 06:12 )             27.5     03-21    140  |  105  |  10  ----------------------------<  175<H>  3.8   |  25  |  0.57    Ca    9.2      21 Mar 2019 06:12    TPro  7.2  /  Alb  4.0  /  TBili  0.2  /  DBili  x   /  AST  27  /  ALT  16  /  AlkPhos  89  03-20      Urinalysis Basic - ( 20 Mar 2019 10:03 )    Color: x / Appearance: x / SG: x / pH: x  Gluc: x / Ketone: x  / Bili: x / Urobili: x   Blood: x / Protein: x / Nitrite: x   Leuk Esterase: x / RBC: < 5 /HPF / WBC > 10 /HPF   Sq Epi: x / Non Sq Epi: 5-10 /HPF / Bacteria: Many /HPF        MEDICATIONS:    acetaminophen   Tablet .. 650 milliGRAM(s) Oral every 6 hours  celecoxib 200 milliGRAM(s) Oral two times a day  ketorolac   Injectable 15 milliGRAM(s) IV Push once  morphine  - Injectable 2 milliGRAM(s) IV Push every 3 hours PRN  morphine  - Injectable 4 milliGRAM(s) IV Push every 15 minutes  ondansetron Injectable 4 milliGRAM(s) IV Push every 6 hours PRN  oxyCODONE    IR 5 milliGRAM(s) Oral every 4 hours PRN  oxyCODONE    IR 10 milliGRAM(s) Oral every 4 hours PRN    aspirin enteric coated 325 milliGRAM(s) Oral two times a day        ASSESSMENT AND PLAN: 62yo Female s/p left thr     1. Analgesic pain control  2. DVT prophylaxis: ASA        3. Weight Bearing Status:  Weight bearing as tolerated    4. Disposition: Home pending PT clearance. POST OPERATIVE DAY #: 1  STATUS POST: Left THR                        SUBJECTIVE: Patient seen and examined. Pt. states she is doing well and was able to walk a little with PT yesterday. Pt. was asked if she had any burning/frequency with urination and denies. Pt. reports she did a course of abx with PMD for UTI.   Denies any sob/cp/n/v/numbness or tingling in b/l in les.     OBJECTIVE:     Vital Signs Last 24 Hrs  T(C): 36.7 (21 Mar 2019 08:24), Max: 37.6 (20 Mar 2019 20:09)  T(F): 98 (21 Mar 2019 08:24), Max: 99.6 (20 Mar 2019 20:09)  HR: 75 (21 Mar 2019 08:24) (56 - 75)  BP: 104/55 (21 Mar 2019 08:24) (96/50 - 115/58)  BP(mean): 76 (20 Mar 2019 15:10) (75 - 80)  RR: 16 (21 Mar 2019 08:24) (14 - 17)  SpO2: 99% (21 Mar 2019 08:24) (98% - 100%)    Affected extremity: left le          Dressing: clean/dry/intact          Sensation: intact to light touch to patient's baseline         Motor exam: EHL/TA/GS 5/5  Pulses 2+ b/l les              I&O's Detail    20 Mar 2019 07:01  -  21 Mar 2019 07:00  --------------------------------------------------------  IN:    lactated ringers.: 1400 mL    Oral Fluid: 340 mL  Total IN: 1740 mL    OUT:    Voided: 550 mL  Total OUT: 550 mL    Total NET: 1190 mL      21 Mar 2019 07:01  -  21 Mar 2019 13:40  --------------------------------------------------------  IN:    Oral Fluid: 360 mL  Total IN: 360 mL    OUT:    Voided: 200 mL  Total OUT: 200 mL    Total NET: 160 mL          LABS:                        8.7    8.54  )-----------( 204      ( 21 Mar 2019 06:12 )             27.5     03-21    140  |  105  |  10  ----------------------------<  175<H>  3.8   |  25  |  0.57    Ca    9.2      21 Mar 2019 06:12    TPro  7.2  /  Alb  4.0  /  TBili  0.2  /  DBili  x   /  AST  27  /  ALT  16  /  AlkPhos  89  03-20      Urinalysis Basic - ( 20 Mar 2019 10:03 )    Color: x / Appearance: x / SG: x / pH: x  Gluc: x / Ketone: x  / Bili: x / Urobili: x   Blood: x / Protein: x / Nitrite: x   Leuk Esterase: x / RBC: < 5 /HPF / WBC > 10 /HPF   Sq Epi: x / Non Sq Epi: 5-10 /HPF / Bacteria: Many /HPF        MEDICATIONS:    acetaminophen   Tablet .. 650 milliGRAM(s) Oral every 6 hours  celecoxib 200 milliGRAM(s) Oral two times a day  ketorolac   Injectable 15 milliGRAM(s) IV Push once  morphine  - Injectable 2 milliGRAM(s) IV Push every 3 hours PRN  morphine  - Injectable 4 milliGRAM(s) IV Push every 15 minutes  ondansetron Injectable 4 milliGRAM(s) IV Push every 6 hours PRN  oxyCODONE    IR 5 milliGRAM(s) Oral every 4 hours PRN  oxyCODONE    IR 10 milliGRAM(s) Oral every 4 hours PRN    aspirin enteric coated 325 milliGRAM(s) Oral two times a day        ASSESSMENT AND PLAN: 62yo Female s/p left thr     1. Analgesic pain control  2. DVT prophylaxis: ASA        3. Weight Bearing Status:  Weight bearing as tolerated    4. Disposition: Home pending PT clearance.   5. UA- will d/w Dr. Hernandez. Pt. asx.

## 2019-03-21 NOTE — DISCHARGE NOTE NURSING/CASE MANAGEMENT/SOCIAL WORK - NSDCCRTYPESERV_GEN_ALL_CORE_FT
[facility] Medicaid Program Patient son is her home health aide  3hours  Monday to Friday  9AM-12 PM

## 2019-03-21 NOTE — DISCHARGE NOTE NURSING/CASE MANAGEMENT/SOCIAL WORK - NSDCDPATPORTLINK_GEN_ALL_CORE
You can access the [facility] Portal, offered by [facility], by registering with the following website: [email or web address]

## 2019-03-21 NOTE — PROGRESS NOTE ADULT - SUBJECTIVE AND OBJECTIVE BOX
S: No events overnight    O:   Vital Signs Last 24 Hrs  T(C): 36.8 (21 Mar 2019 05:02), Max: 37.6 (20 Mar 2019 20:09)  T(F): 98.2 (21 Mar 2019 05:02), Max: 99.6 (20 Mar 2019 20:09)  HR: 74 (21 Mar 2019 05:02) (52 - 74)  BP: 96/50 (21 Mar 2019 05:02) (96/50 - 120/58)  BP(mean): 76 (20 Mar 2019 15:10) (74 - 82)  RR: 17 (21 Mar 2019 05:02) (12 - 20)  SpO2: 99% (21 Mar 2019 05:02) (98% - 100%)    03-20 @ 07:01  -  03-21 @ 06:24  --------------------------------------------------------  IN:    lactated ringers.: 1400 mL    Oral Fluid: 340 mL  Total IN: 1740 mL    OUT:    Voided: 550 mL  Total OUT: 550 mL    Total NET: 1190 mL    PE:  LLE  DSG CDI  Sensation to light touch intact S/S/DP/SP/Tib, Strength EHL/FHL/TA/GS 5/5, DP 2+, Ext WWP                            10.5   7.56  )-----------( 238      ( 20 Mar 2019 13:09 )             33.6     03-20    136  |  102  |  11  ----------------------------<  132<H>  4.4   |  25  |  0.63    Ca    9.9      20 Mar 2019 13:08    TPro  7.2  /  Alb  4.0  /  TBili  0.2  /  DBili  x   /  AST  27  /  ALT  16  /  AlkPhos  89  03-20

## 2019-03-21 NOTE — DISCHARGE NOTE NURSING/CASE MANAGEMENT/SOCIAL WORK - NSDCCRNUMBER_GEN_ALL_CORE_FT
Tel [phone]  Coordinator " Claudia" Tel [phone]  Coordinator " Claudia", [facility], [phone]

## 2019-03-22 ENCOUNTER — TRANSCRIPTION ENCOUNTER (OUTPATIENT)
Age: 64
End: 2019-03-22

## 2019-03-22 VITALS — HEART RATE: 84 BPM | DIASTOLIC BLOOD PRESSURE: 59 MMHG | SYSTOLIC BLOOD PRESSURE: 106 MMHG

## 2019-03-22 LAB
-  AMPICILLIN/SULBACTAM: SIGNIFICANT CHANGE UP
-  AMPICILLIN: SIGNIFICANT CHANGE UP
-  CEFAZOLIN: SIGNIFICANT CHANGE UP
-  CEFTRIAXONE: SIGNIFICANT CHANGE UP
-  CIPROFLOXACIN: SIGNIFICANT CHANGE UP
-  GENTAMICIN: SIGNIFICANT CHANGE UP
-  NITROFURANTOIN: SIGNIFICANT CHANGE UP
-  PIPERACILLIN/TAZOBACTAM: SIGNIFICANT CHANGE UP
-  TOBRAMYCIN: SIGNIFICANT CHANGE UP
-  TRIMETHOPRIM/SULFAMETHOXAZOLE: SIGNIFICANT CHANGE UP
ANION GAP SERPL CALC-SCNC: 10 MMOL/L — SIGNIFICANT CHANGE UP (ref 5–17)
BUN SERPL-MCNC: 9 MG/DL — SIGNIFICANT CHANGE UP (ref 7–23)
CALCIUM SERPL-MCNC: 9.1 MG/DL — SIGNIFICANT CHANGE UP (ref 8.4–10.5)
CHLORIDE SERPL-SCNC: 107 MMOL/L — SIGNIFICANT CHANGE UP (ref 96–108)
CO2 SERPL-SCNC: 26 MMOL/L — SIGNIFICANT CHANGE UP (ref 22–31)
CREAT SERPL-MCNC: 0.64 MG/DL — SIGNIFICANT CHANGE UP (ref 0.5–1.3)
CULTURE RESULTS: SIGNIFICANT CHANGE UP
GLUCOSE SERPL-MCNC: 104 MG/DL — HIGH (ref 70–99)
HCT VFR BLD CALC: 27.4 % — LOW (ref 34.5–45)
HGB BLD-MCNC: 8.8 G/DL — LOW (ref 11.5–15.5)
MCHC RBC-ENTMCNC: 31.3 PG — SIGNIFICANT CHANGE UP (ref 27–34)
MCHC RBC-ENTMCNC: 32.1 GM/DL — SIGNIFICANT CHANGE UP (ref 32–36)
MCV RBC AUTO: 97.5 FL — SIGNIFICANT CHANGE UP (ref 80–100)
METHOD TYPE: SIGNIFICANT CHANGE UP
NRBC # BLD: 0 /100 WBCS — SIGNIFICANT CHANGE UP (ref 0–0)
ORGANISM # SPEC MICROSCOPIC CNT: SIGNIFICANT CHANGE UP
ORGANISM # SPEC MICROSCOPIC CNT: SIGNIFICANT CHANGE UP
PLATELET # BLD AUTO: 196 K/UL — SIGNIFICANT CHANGE UP (ref 150–400)
POTASSIUM SERPL-MCNC: 3.8 MMOL/L — SIGNIFICANT CHANGE UP (ref 3.5–5.3)
POTASSIUM SERPL-SCNC: 3.8 MMOL/L — SIGNIFICANT CHANGE UP (ref 3.5–5.3)
RBC # BLD: 2.81 M/UL — LOW (ref 3.8–5.2)
RBC # FLD: 15.3 % — HIGH (ref 10.3–14.5)
SODIUM SERPL-SCNC: 143 MMOL/L — SIGNIFICANT CHANGE UP (ref 135–145)
SPECIMEN SOURCE: SIGNIFICANT CHANGE UP
WBC # BLD: 6.85 K/UL — SIGNIFICANT CHANGE UP (ref 3.8–10.5)
WBC # FLD AUTO: 6.85 K/UL — SIGNIFICANT CHANGE UP (ref 3.8–10.5)

## 2019-03-22 PROCEDURE — 97161 PT EVAL LOW COMPLEX 20 MIN: CPT

## 2019-03-22 PROCEDURE — 81001 URINALYSIS AUTO W/SCOPE: CPT

## 2019-03-22 PROCEDURE — 80053 COMPREHEN METABOLIC PANEL: CPT

## 2019-03-22 PROCEDURE — 72170 X-RAY EXAM OF PELVIS: CPT

## 2019-03-22 PROCEDURE — 99233 SBSQ HOSP IP/OBS HIGH 50: CPT

## 2019-03-22 PROCEDURE — 86901 BLOOD TYPING SEROLOGIC RH(D): CPT

## 2019-03-22 PROCEDURE — 76000 FLUOROSCOPY <1 HR PHYS/QHP: CPT

## 2019-03-22 PROCEDURE — 97116 GAIT TRAINING THERAPY: CPT

## 2019-03-22 PROCEDURE — 87186 SC STD MICRODIL/AGAR DIL: CPT

## 2019-03-22 PROCEDURE — 86850 RBC ANTIBODY SCREEN: CPT

## 2019-03-22 PROCEDURE — C1776: CPT

## 2019-03-22 PROCEDURE — 88300 SURGICAL PATH GROSS: CPT

## 2019-03-22 PROCEDURE — 85025 COMPLETE CBC W/AUTO DIFF WBC: CPT

## 2019-03-22 PROCEDURE — 86900 BLOOD TYPING SEROLOGIC ABO: CPT

## 2019-03-22 PROCEDURE — 85027 COMPLETE CBC AUTOMATED: CPT

## 2019-03-22 PROCEDURE — 80048 BASIC METABOLIC PNL TOTAL CA: CPT

## 2019-03-22 PROCEDURE — 87086 URINE CULTURE/COLONY COUNT: CPT

## 2019-03-22 PROCEDURE — 36415 COLL VENOUS BLD VENIPUNCTURE: CPT

## 2019-03-22 RX ORDER — HYDROCHLOROTHIAZIDE 25 MG
25 TABLET ORAL DAILY
Qty: 0 | Refills: 0 | Status: DISCONTINUED | OUTPATIENT
Start: 2019-03-22 | End: 2019-03-22

## 2019-03-22 RX ORDER — TRAMADOL HYDROCHLORIDE 50 MG/1
0 TABLET ORAL
Qty: 0 | Refills: 0 | COMMUNITY

## 2019-03-22 RX ORDER — ATENOLOL 25 MG/1
25 TABLET ORAL DAILY
Qty: 0 | Refills: 0 | Status: DISCONTINUED | OUTPATIENT
Start: 2019-03-22 | End: 2019-03-22

## 2019-03-22 RX ORDER — ACETAMINOPHEN 500 MG
0 TABLET ORAL
Qty: 0 | Refills: 0 | COMMUNITY

## 2019-03-22 RX ORDER — ALENDRONATE SODIUM 70 MG/1
1 TABLET ORAL
Qty: 0 | Refills: 0 | COMMUNITY

## 2019-03-22 RX ADMIN — Medication 325 MILLIGRAM(S): at 17:39

## 2019-03-22 RX ADMIN — CELECOXIB 200 MILLIGRAM(S): 200 CAPSULE ORAL at 17:39

## 2019-03-22 RX ADMIN — Medication 650 MILLIGRAM(S): at 06:28

## 2019-03-22 RX ADMIN — PANTOPRAZOLE SODIUM 40 MILLIGRAM(S): 20 TABLET, DELAYED RELEASE ORAL at 05:27

## 2019-03-22 RX ADMIN — OXYCODONE HYDROCHLORIDE 5 MILLIGRAM(S): 5 TABLET ORAL at 11:30

## 2019-03-22 RX ADMIN — OXYCODONE HYDROCHLORIDE 5 MILLIGRAM(S): 5 TABLET ORAL at 14:33

## 2019-03-22 RX ADMIN — CELECOXIB 200 MILLIGRAM(S): 200 CAPSULE ORAL at 06:28

## 2019-03-22 RX ADMIN — OXYCODONE HYDROCHLORIDE 5 MILLIGRAM(S): 5 TABLET ORAL at 02:20

## 2019-03-22 RX ADMIN — Medication 100 MILLIGRAM(S): at 12:27

## 2019-03-22 RX ADMIN — Medication 100 MILLIGRAM(S): at 05:28

## 2019-03-22 RX ADMIN — Medication 325 MILLIGRAM(S): at 05:28

## 2019-03-22 RX ADMIN — OXYCODONE HYDROCHLORIDE 5 MILLIGRAM(S): 5 TABLET ORAL at 15:30

## 2019-03-22 RX ADMIN — OXYCODONE HYDROCHLORIDE 5 MILLIGRAM(S): 5 TABLET ORAL at 10:39

## 2019-03-22 RX ADMIN — OXYCODONE HYDROCHLORIDE 5 MILLIGRAM(S): 5 TABLET ORAL at 03:20

## 2019-03-22 RX ADMIN — Medication 650 MILLIGRAM(S): at 13:24

## 2019-03-22 RX ADMIN — Medication 650 MILLIGRAM(S): at 17:38

## 2019-03-22 RX ADMIN — Medication 650 MILLIGRAM(S): at 05:28

## 2019-03-22 RX ADMIN — Medication 650 MILLIGRAM(S): at 12:27

## 2019-03-22 RX ADMIN — POLYETHYLENE GLYCOL 3350 17 GRAM(S): 17 POWDER, FOR SOLUTION ORAL at 12:27

## 2019-03-22 RX ADMIN — CELECOXIB 200 MILLIGRAM(S): 200 CAPSULE ORAL at 05:28

## 2019-03-22 NOTE — PROGRESS NOTE ADULT - SUBJECTIVE AND OBJECTIVE BOX
INTERVAL HPI/OVERNIGHT EVENTS:  no issues overnight  still with sig hip pain.    MEDICATIONS  (STANDING):  acetaminophen   Tablet .. 650 milliGRAM(s) Oral every 6 hours  aspirin enteric coated 325 milliGRAM(s) Oral two times a day  ATENolol  Tablet 25 milliGRAM(s) Oral daily  BUpivacaine liposome 1.3% Injectable (no eMAR) 20 milliLiter(s) Local Injection once  celecoxib 200 milliGRAM(s) Oral two times a day  docusate sodium 100 milliGRAM(s) Oral three times a day  hydrochlorothiazide 25 milliGRAM(s) Oral daily  ketorolac   Injectable 15 milliGRAM(s) IV Push once  lactated ringers. 1000 milliLiter(s) (100 mL/Hr) IV Continuous <Continuous>  morphine  - Injectable 4 milliGRAM(s) IV Push every 15 minutes  pantoprazole    Tablet 40 milliGRAM(s) Oral before breakfast  polyethylene glycol 3350 17 Gram(s) Oral daily    MEDICATIONS  (PRN):  aluminum hydroxide/magnesium hydroxide/simethicone Suspension 30 milliLiter(s) Oral four times a day PRN Indigestion  bisacodyl Suppository 10 milliGRAM(s) Rectal daily PRN If no bowel movement by POD#2  magnesium hydroxide Suspension 30 milliLiter(s) Oral daily PRN Constipation  morphine  - Injectable 2 milliGRAM(s) IV Push every 3 hours PRN Severe Pain (7 - 10)  ondansetron Injectable 4 milliGRAM(s) IV Push every 6 hours PRN Nausea and/or Vomiting  oxyCODONE    IR 5 milliGRAM(s) Oral every 4 hours PRN Mild Pain (1 - 3)  oxyCODONE    IR 10 milliGRAM(s) Oral every 4 hours PRN Moderate Pain (4 - 6)  senna 2 Tablet(s) Oral at bedtime PRN Constipation      Allergies    No Known Allergies      REVIEW OF SYSTEMS:  CONSTITUTIONAL:  No night sweats.  Some fatigue,  No fever or chills.  HEENT:  Eyes:  No visual changes.    RESPIRATORY:  No cough.  No wheeze.    No shortness of breath.  CARDIOVASCULAR:  No chest pains.  No palpitations.  GASTROINTESTINAL:  No abdominal pain.  No nausea or vomiting.  No diarrhea    GENITOURINARY:  No urgency.  No frequency.  No dysuria.  No hematuria.    MUSCULOSKELETAL:  left hip pain present   SKIN:  No rashes.      T(C): 36.8 (03-22-19 @ 08:21), Max: 37.4 (03-22-19 @ 05:01)  HR: 90 (03-22-19 @ 08:21) (77 - 92)  BP: 94/54 (03-22-19 @ 08:21) (94/54 - 99/50)  RR: 15 (03-22-19 @ 08:21) (15 - 16)  SpO2: 98% (03-22-19 @ 08:21) (97% - 98%)  Wt(kg): --    PHYSICAL EXAM:   General - NAD, Alert and oriented x 3,   Neck - - No lymphadenopathy  Cardiovascular - RRR no m/r/g, no JVD  Lungs - Clear to auscultation, no use of accessory muscles, no crackles or wheezes.  Skin - No rashes, skin warm and dry, no erythematous areas  Abdomen - Normal bowel sounds, abdomen soft and nontender  Extremeties - No edema,   Neurological – no focal deficits    LABS:                        8.8    6.85  )-----------( 196      ( 22 Mar 2019 07:20 )             27.4     03-22    143  |  107  |  9   ----------------------------<  104<H>  3.8   |  26  |  0.64    Ca    9.1      22 Mar 2019 07:20    TPro  7.2  /  Alb  4.0  /  TBili  0.2  /  DBili  x   /  AST  27  /  ALT  16  /  AlkPhos  89  03-20      Urinalysis Basic - ( 20 Mar 2019 10:03 )    Color: x / Appearance: x / SG: x / pH: x  Gluc: x / Ketone: x  / Bili: x / Urobili: x   Blood: x / Protein: x / Nitrite: x   Leuk Esterase: x / RBC: < 5 /HPF / WBC > 10 /HPF   Sq Epi: x / Non Sq Epi: 5-10 /HPF / Bacteria: Many /HPF        RADIOLOGY & ADDITIONAL TESTS:

## 2019-03-22 NOTE — PROGRESS NOTE ADULT - SUBJECTIVE AND OBJECTIVE BOX
S: No events overnight    O:   Vital Signs Last 24 Hrs  T(C): 36.8 (22 Mar 2019 08:21), Max: 37.4 (22 Mar 2019 05:01)  T(F): 98.3 (22 Mar 2019 08:21), Max: 99.4 (22 Mar 2019 05:01)  HR: 90 (22 Mar 2019 08:21) (77 - 92)  BP: 94/54 (22 Mar 2019 08:21) (94/54 - 99/50)  BP(mean): --  ABP: --  ABP(mean): --  RR: 15 (22 Mar 2019 08:21) (15 - 16)  SpO2: 98% (22 Mar 2019 08:21) (97% - 98%)    PE:  LLE  DSG CDI  Sensation to light touch intact S/S/DP/SP/Tib, Strength EHL/FHL/TA/GS 5/5, DP 2+, Ext WWP

## 2019-03-22 NOTE — PROGRESS NOTE ADULT - SUBJECTIVE AND OBJECTIVE BOX
POST OPERATIVE DAY #: 2  STATUS POST: Left  THR                        SUBJECTIVE: Patient seen and examined. Pt. worried about low bp, encouraged to drink more fluids. Pt. also would like to leave tomorrow for home, but reassured she will be cleared by PT today and is medically stable for home today. Denies any sob/cp/n/v/numbness or tingling in b/l les.     OBJECTIVE:     Vital Signs Last 24 Hrs  T(C): 36.8 (22 Mar 2019 08:21), Max: 37.4 (22 Mar 2019 05:01)  T(F): 98.3 (22 Mar 2019 08:21), Max: 99.4 (22 Mar 2019 05:01)  HR: 84 (22 Mar 2019 10:37) (77 - 92)  BP: 106/59 (22 Mar 2019 10:37) (94/54 - 106/59)  BP(mean): --  RR: 15 (22 Mar 2019 08:21) (15 - 16)  SpO2: 98% (22 Mar 2019 08:21) (97% - 98%)    Affected extremity: left le          Dressing: clean/dry/intact          Sensation: intact to light touch to patient's baseline         Motor exam: EHL/TA/GS  5/5  Pulses 2+              I&O's Detail    21 Mar 2019 07:01  -  22 Mar 2019 07:00  --------------------------------------------------------  IN:    Oral Fluid: 840 mL  Total IN: 840 mL    OUT:    Voided: 850 mL  Total OUT: 850 mL    Total NET: -10 mL      22 Mar 2019 07:01  -  22 Mar 2019 14:44  --------------------------------------------------------  IN:    Oral Fluid: 780 mL  Total IN: 780 mL    OUT:    Voided: 760 mL  Total OUT: 760 mL    Total NET: 20 mL          LABS:                        8.8    6.85  )-----------( 196      ( 22 Mar 2019 07:20 )             27.4     03-22    143  |  107  |  9   ----------------------------<  104<H>  3.8   |  26  |  0.64    Ca    9.1      22 Mar 2019 07:20            MEDICATIONS:    acetaminophen   Tablet .. 650 milliGRAM(s) Oral every 6 hours  celecoxib 200 milliGRAM(s) Oral two times a day  ketorolac   Injectable 15 milliGRAM(s) IV Push once  morphine  - Injectable 2 milliGRAM(s) IV Push every 3 hours PRN  morphine  - Injectable 4 milliGRAM(s) IV Push every 15 minutes  ondansetron Injectable 4 milliGRAM(s) IV Push every 6 hours PRN  oxyCODONE    IR 5 milliGRAM(s) Oral every 4 hours PRN  oxyCODONE    IR 10 milliGRAM(s) Oral every 4 hours PRN    aspirin enteric coated 325 milliGRAM(s) Oral two times a day        ASSESSMENT AND PLAN: 64yo Female s/p left thr     1. Analgesic pain control  2. DVT prophylaxis: ASA        3. Weight Bearing Status:  Weight bearing as tolerated      4. Disposition: Home today. Cleared by PT.   5. Hypotension- d/w Dr. Hernandez, hold bp meds for sbp < 110. Pt. can resume home meds on dc.

## 2019-03-22 NOTE — DISCHARGE NOTE PROVIDER - NSDCFUADDINST_GEN_ALL_CORE_FT
See attached instructions from Dr. Puckett about post op care/prescriptions.   Weight bear as tolerated with assistive device  No strenuous activity, heavy lifting, driving or returning to work until cleared by MD.  You may shower - dressing is water-resistant, no soaking in bathtubs.  Remove dressing after post op day 5-7, then leave incision open to air. Keep incision clean and dry.  Try to have regular bowel movements, take stool softener or laxative if necessary.  May take Pepcid or Zantac for upset stomach.  May take Aleve or Naproxen instead of Meloxicam/Celebrex.  Swelling may travel all the way down leg to foot, this is normal and will subside in a few weeks.  Call to schedule an appt with Dr. Puckett for follow up, if you have staples or sutures they will be removed in office.  Contact your doctor if you experience: fever greater than 101.5, chills, chest pain, difficulty breathing, redness or excessive drainage around the incision, other concerns.  Follow up with your primary care provider.

## 2019-03-22 NOTE — PROGRESS NOTE ADULT - ASSESSMENT
Scheduling Attempts    Date: January 4, 2018    Time: 8:57 AM        Home Phone: [phone] (home)   []  Preferred [] Left message [] NA/Busy     Mobile [phone]      [x]  Preferred [] Left message [] NA/Busy  Spoke with patient   Work Phone: There is no work phone number on file.    []  Preferred [] Left message [] NA/Busy    Comment: patient is still waiting on his insurance information from [employer]           
63y f s/p L GUMARO, anterior  on 3/20    -PT WBAT  -Pain control  -DVT PPX: ASA   -Dispo Planning: Home
63y f s/p L GUMARO, anterior  on 3/20    -PT WBAT  -Pain control  -DVT PPX: ASA   -Dispo Planning: Home
64 yo f with hx of HTN, Osteoporosis, s/p Left THR    1) Anemia- post op - follow trend  2) HTN- Continues to stay low, she remains asymptomatic, Atenolol and HCTZ - Hold SBP <110  3) Bacteruria - Treated previously with bactrim - no role for further antibiotics, risk outweighs benefit  4) DVT ppx with asa bid dosing

## 2019-03-22 NOTE — DISCHARGE NOTE PROVIDER - HOSPITAL COURSE
Admitted    Surgery    Latonya-op Antibiotics    Pain control    DVT prophylaxis    OOB/Physical Therapy

## 2019-03-22 NOTE — DISCHARGE NOTE PROVIDER - CARE PROVIDER_API CALL
Baljeet Puckett)  Orthopaedic Surgery  [address], [address] [place] [address]  Phone: [phone]  Fax: [phone]  Follow Up Time:

## 2019-03-25 LAB — SURGICAL PATHOLOGY STUDY: SIGNIFICANT CHANGE UP

## 2019-03-26 DIAGNOSIS — M16.32 UNILATERAL OSTEOARTHRITIS RESULTING FROM HIP DYSPLASIA, LEFT HIP: ICD-10-CM

## 2019-03-26 DIAGNOSIS — D64.9 ANEMIA, UNSPECIFIED: ICD-10-CM

## 2019-03-26 DIAGNOSIS — I95.9 HYPOTENSION, UNSPECIFIED: ICD-10-CM

## 2019-03-26 DIAGNOSIS — I10 ESSENTIAL (PRIMARY) HYPERTENSION: ICD-10-CM

## 2019-03-26 DIAGNOSIS — M16.12 UNILATERAL PRIMARY OSTEOARTHRITIS, LEFT HIP: ICD-10-CM

## 2019-04-03 ENCOUNTER — FORM ENCOUNTER (OUTPATIENT)
Age: 64
End: 2019-04-03

## 2019-04-04 ENCOUNTER — OUTPATIENT (OUTPATIENT)
Dept: OUTPATIENT SERVICES | Facility: HOSPITAL | Age: 64
LOS: 1 days | End: 2019-04-04
Payer: MEDICAID

## 2019-04-04 ENCOUNTER — APPOINTMENT (OUTPATIENT)
Dept: ORTHOPEDIC SURGERY | Facility: CLINIC | Age: 64
End: 2019-04-04
Payer: MEDICAID

## 2019-04-04 DIAGNOSIS — Z98.891 HISTORY OF UTERINE SCAR FROM PREVIOUS SURGERY: Chronic | ICD-10-CM

## 2019-04-04 PROBLEM — M19.90 UNSPECIFIED OSTEOARTHRITIS, UNSPECIFIED SITE: Chronic | Status: ACTIVE | Noted: 2019-03-19

## 2019-04-04 PROBLEM — I10 ESSENTIAL (PRIMARY) HYPERTENSION: Chronic | Status: ACTIVE | Noted: 2019-03-19

## 2019-04-04 PROCEDURE — 73501 X-RAY EXAM HIP UNI 1 VIEW: CPT

## 2019-04-04 PROCEDURE — 99024 POSTOP FOLLOW-UP VISIT: CPT

## 2019-04-04 PROCEDURE — 73501 X-RAY EXAM HIP UNI 1 VIEW: CPT | Mod: 26,LT

## 2019-04-04 RX ORDER — OMEPRAZOLE 40 MG/1
CAPSULE, DELAYED RELEASE ORAL
Refills: 0 | Status: DISCONTINUED | COMMUNITY
End: 2019-04-04

## 2019-04-04 RX ORDER — DICLOFENAC SODIUM 75 MG/1
TABLET, DELAYED RELEASE ORAL
Refills: 0 | Status: DISCONTINUED | COMMUNITY
End: 2019-04-04

## 2019-04-04 RX ORDER — SULFAMETHOXAZOLE AND TRIMETHOPRIM 800; 160 MG/1; MG/1
800-160 TABLET ORAL
Qty: 6 | Refills: 0 | Status: DISCONTINUED | COMMUNITY
Start: 2019-03-18 | End: 2019-04-04

## 2019-04-08 NOTE — HISTORY OF PRESENT ILLNESS
[Healed] : healed [Neuro Intact] : an unremarkable neurological exam [Vascular Intact] : ~T peripheral vascular exam normal [Negative Bhavesh's] : maneuvers demonstrated a negative Bhavesh's sign [Erythema] : not erythematous [Discharge] : absent of discharge [Dehiscence] : not dehisced [Doing Well] : is doing well [Excellent Pain Control] : has excellent pain control [No Sign of Infection] : is showing no signs of infection [de-identified] : first post op left total hip replacement, anterior, surgical date 3/20/19 [de-identified] : Patient presents for first post op visit s/p left anterior GUMARO 3/20/19. She states she is doing well overall. She has finished home PT and ambulates with a walker, has not yet begun transitioning to cane. Pain is well controlled with Percocet every 4 hours. She is taking ASA BID. [de-identified] : Patient is alert and oriented x3\par Leg lengths clinically equal\par Left hip: Well-healed anterior surgical scar without erythema or ecchymosis. Acceptable post op swelling. ROM from full extension to 80 degrees of flexion, external rotation 15 degrees, internal rotation neutral, abduction 30 degrees, adduction 5 degrees. Calf soft and nontender. NVI. Flexor power 5-/5. [de-identified] : X-rays taken today demonstrate well-fixed left total hip replacement in overall good alignment [de-identified] : Patient will begin PT and advised to stretch to improve ROM. Gradually transition from walker to cane. Gradually wean off pain medication. Continue ASA BID until 4 weeks post op. Follow up in 4-6 weeks.

## 2019-04-08 NOTE — ADDENDUM
[FreeTextEntry1] : Dr. Puckett was physically present during the key portions the encounter, provided assistance as needed, and formulated the assessment and plan as documented above.\par \par \par

## 2019-05-09 ENCOUNTER — APPOINTMENT (OUTPATIENT)
Dept: ORTHOPEDIC SURGERY | Facility: CLINIC | Age: 64
End: 2019-05-09
Payer: MEDICAID

## 2019-05-09 PROCEDURE — 99024 POSTOP FOLLOW-UP VISIT: CPT

## 2019-05-09 RX ORDER — OXYCODONE AND ACETAMINOPHEN 5; 325 MG/1; MG/1
5-325 TABLET ORAL
Qty: 50 | Refills: 0 | Status: DISCONTINUED | COMMUNITY
Start: 2019-03-20 | End: 2019-05-09

## 2019-05-09 RX ORDER — ASPIRIN/ACETAMINOPHEN/CAFFEINE 500-325-65
325 POWDER IN PACKET (EA) ORAL
Qty: 60 | Refills: 0 | Status: DISCONTINUED | COMMUNITY
Start: 2019-03-20 | End: 2019-05-09

## 2019-05-09 NOTE — HISTORY OF PRESENT ILLNESS
[5] : the patient reports pain that is 5/10 in severity [Clean/Dry/Intact] : clean, dry and intact [Excellent Pain Control] : has excellent pain control [Doing Well] : is doing well [None] : None [No Sign of Infection] : is showing no signs of infection [Healed] : healed [Chills] : no chills [Fever] : no fever [Nausea] : no nausea [Vomiting] : no vomiting [Erythema] : not erythematous [de-identified] : second post op s/p Left THR Anterior DOS: 3/20/19 [de-identified] : 63 year old female presents for second post op s/p left anterior THR 3/20/19. She is doing well overall. She reports moderate, daily left hip pain localized to the thigh that renders her unable to place shoes on the left foot. Patient can walk less than 5 blocks with a moderate limp and a cane most of the time. She can use stairs, but with difficulty and reports being unable to enter public transportation. She is taking ASA and Celebrex and takes Tramadol at night. [de-identified] : No new imaging was reviewed at this time. [de-identified] : Patient is alert and oriented x3.\par Presents with cane.\par Leg lengths: left leg appears 3-4 mm longer than right.\par Left hip: Well-healed surgical scar without erythema or ecchymosis. Acceptable post-op swelling. ROM without evident restriction.\par Calf is soft and nontender.\par NVI. [de-identified] : Ms. Carrillo is a 62 y/o F doing well overall s/p left THR. I showed her some stretches she can do to improve hip flexibility. I do not believe she needs to attend physical therapy as long as she does these stretches on her own. She has no physical restrictions on what positions she can put the hip in. Ms. Carrillo should try to ignore discomfort and use the hip normally. She is not required to use a cane but may do so if it helps with her balance and coordination. Patient does not need to continue taking ASA but should continue taking Celebrex. When it runs out, she can take Advil or Aleve instead. She should wean off of Tramadol. \par Follow up in 2 months.

## 2019-05-09 NOTE — ADDENDUM
[FreeTextEntry1] : This note was written by Geni Rothman on 05/09/2019 acting as scribe for Dr. Puckett and ROXANNA Caldera.

## 2019-05-09 NOTE — END OF VISIT
[FreeTextEntry3] : All medical record entries made by the Scribe were at my, Dr. Puckett's, discretion and personally dictated by me on 05/09/2019. I have reviewed the chart and agree that the record accurately reflects my personal performance of the history, physical exam, assessment and plan. I have also personally directed, reviewed and agreed to the chart.

## 2019-07-11 ENCOUNTER — APPOINTMENT (OUTPATIENT)
Dept: ORTHOPEDIC SURGERY | Facility: CLINIC | Age: 64
End: 2019-07-11
Payer: MEDICAID

## 2019-07-11 PROCEDURE — 99213 OFFICE O/P EST LOW 20 MIN: CPT

## 2019-07-11 NOTE — PHYSICAL EXAM
[de-identified] : Patient is alert and oriented x3.\par Presents with cane.\par Leg lengths: left leg appears 3-4 mm longer than right.\par Left hip: Well-healed surgical scar without erythema or ecchymosis. Acceptable post-op swelling. ROM from full extension to 90 degrees of flexion, external rotation 40 degrees, internal rotation neutral, abduction 40 degrees, adduction neutral.\par Calf is soft and nontender.\par NVI. The surgical incision site(s) clean, dry and intact, healed and not erythematous. \par  [de-identified] : None today

## 2019-07-11 NOTE — ADDENDUM
[FreeTextEntry1] : Dr. Puckett was physically present during the key portions the encounter, provided assistance as needed, and formulated the assessment and plan as documented above.\par \par

## 2019-07-11 NOTE — DISCUSSION/SUMMARY
[de-identified] : Patient was given list of new locations to resume physical therapy and advised to focus on stretching to improve ROM. Follow up in 2 months, sooner with new or worsening symptoms.

## 2019-07-11 NOTE — HISTORY OF PRESENT ILLNESS
[de-identified] : Patient presents for follow up s/p left anterior GUMARO 3/20/19. She notes progress since last visit but still notes stiffness and mild groin pain after sitting for prolonged periods. She has been going to PT but is upset they only massage the hip and do not work on stretching or strengthening. She is ambulating without assistance.

## 2019-09-12 ENCOUNTER — APPOINTMENT (OUTPATIENT)
Dept: ORTHOPEDIC SURGERY | Facility: CLINIC | Age: 64
End: 2019-09-12
Payer: MEDICAID

## 2019-09-12 PROCEDURE — 99213 OFFICE O/P EST LOW 20 MIN: CPT

## 2019-09-12 NOTE — PHYSICAL EXAM
[de-identified] : Well appearing. NAD. Alert and oriented x 3. No respiratory distress.\par Bilateral upper extremities: Skin intact. No deformity. Painless active ROM without evident restriction.\par Cervical, thoracic and lumbar spine: No visible deformity. Painless active ROM without evident restriction. No radicular pain on passive straight leg raise bilaterally.\par \par Pelvis: No pelvic obliquity. No tenderness.\par Leg lengths: Equal.\par \par Gait: Normal.\par Ambulatory assist devices: None.\par \par Right Hip:\par Skin intact.\par No surgical scars.\par No erythema. No ecchymosis. No swelling. No deformity. No focal tenderness.\par Painless and unrestricted range of motion. \par No crepitation. No instability.\par Abductor power 5/5. Flexor power 5/5.\par \par Left Hip:\par Skin intact. (+) Well healed surgical scar. No erythema. No ecchymosis. No swelling. No deformity. No focal tenderness.\par Painless and unrestricted passive range of motion. 100 degrees flexion, 45 abduction, <10 add 45 ER, <10 IR, < 10.\par LAURA painless.\par Impingement (FADIR) painless.\par Stinchfield painful.\par No crepitation. No instability.\par Abductor power 5/5. Flexor power 5/5.\par \par Bilateral Knees: Skin intact. No surgical scars. No erythema or ecchymosis. No swelling or effusion. No deformity. No focal tenderness. Painless and unrestricted range of motion. Central patellar tracking. No crepitation. No instability. Quadriceps power 5/5.\par \par Neurological: Intact distal crude touch sensation. Normal distal motor power. \par Cardiovascular: Lower extremities warm and well perfused. No peripheral edema. [de-identified] : No new imaging was reviewed at this time.

## 2019-09-12 NOTE — DISCUSSION/SUMMARY
[de-identified] : Ms. Carrillo has some left hip area pain nearly 6 months s/p left THR. I suspect patient has hip flexor tendinitis from doing straight leg lifts to the front. I believe that her pain will go away if she stops doing this exercise. I showed patient some stretches she can do in order to be able to place shoes on the left foot with ease. I recommend she try to sleep in a certain position that will help to stretch the muscles around the hip. I told patient that it's normal to experience itchiness near a surgical site. \par Follow up in 6 weeks if patient doesn't feel better, otherwise one year post surgery.

## 2019-09-12 NOTE — END OF VISIT
[FreeTextEntry3] : All medical record entries made by Mustapha Rothman acting as a scribe for the performing provider (Baljeet Puckett MD and/or ROXANNA Caldera) on 09/12/2019. All entries were dictated to me by the performing medical provider. In signing this record, the medical provider affirms that they have personally performed the history, physical exam, assessment and plan and have also directed, reviewed and agreed to the documentation in the chart.

## 2019-09-12 NOTE — HISTORY OF PRESENT ILLNESS
[de-identified] : 64 year old F presents for follow up nearly 6 months s/p left THR. She states that in general her pain is significantly less severe than it was prior to the operation but that a few days ago she started to experience increased pain. She denies any incident or injury and denies starting any new medications. She report moderate, daily left hip pain localized to the groin and the buttock that occurs at night. She has a lot of difficulty placing shoes on the left foot and states that lifting the left leg is painful. She also reports that her thigh is very itchy. Patient can walk less than 5 blocks with a moderate limp and a cane most of the time. She reports that she has done straight leg lifts to the front on the left side during physical therapy. She uses a rail to ascend and descend stairs. She reports being unable to sit comfortably in any chair.

## 2019-10-21 ENCOUNTER — FORM ENCOUNTER (OUTPATIENT)
Age: 64
End: 2019-10-21

## 2019-10-22 ENCOUNTER — OUTPATIENT (OUTPATIENT)
Dept: OUTPATIENT SERVICES | Facility: HOSPITAL | Age: 64
LOS: 1 days | End: 2019-10-22
Payer: MEDICAID

## 2019-10-22 ENCOUNTER — APPOINTMENT (OUTPATIENT)
Dept: ORTHOPEDIC SURGERY | Facility: CLINIC | Age: 64
End: 2019-10-22
Payer: MEDICAID

## 2019-10-22 DIAGNOSIS — Z96.649 PAIN DUE TO INTERNAL ORTHOPEDIC PROSTHETIC DEVICES, IMPLANTS AND GRAFTS, SUBSEQUENT ENCOUNTER: ICD-10-CM

## 2019-10-22 DIAGNOSIS — Z98.891 HISTORY OF UTERINE SCAR FROM PREVIOUS SURGERY: Chronic | ICD-10-CM

## 2019-10-22 DIAGNOSIS — T84.84XD PAIN DUE TO INTERNAL ORTHOPEDIC PROSTHETIC DEVICES, IMPLANTS AND GRAFTS, SUBSEQUENT ENCOUNTER: ICD-10-CM

## 2019-10-22 PROCEDURE — 99213 OFFICE O/P EST LOW 20 MIN: CPT

## 2019-10-22 PROCEDURE — 73502 X-RAY EXAM HIP UNI 2-3 VIEWS: CPT | Mod: 26,LT

## 2019-10-22 PROCEDURE — 73502 X-RAY EXAM HIP UNI 2-3 VIEWS: CPT

## 2019-10-22 PROCEDURE — 72100 X-RAY EXAM L-S SPINE 2/3 VWS: CPT | Mod: 26

## 2019-10-22 PROCEDURE — 72100 X-RAY EXAM L-S SPINE 2/3 VWS: CPT

## 2019-11-01 LAB
CRP SERPL-MCNC: 0.27 MG/DL
ERYTHROCYTE [SEDIMENTATION RATE] IN BLOOD BY WESTERGREN METHOD: 27 MM/HR

## 2019-11-01 NOTE — END OF VISIT
[FreeTextEntry3] : All medical record entries made by Mustapha Rothman acting as a scribe for the performing provider (Baljeet Puckett MD and/or ROXANNA Caldera) on 10/22/2019. All entries were dictated to me by the performing medical provider. In signing this record, the medical provider affirms that they have personally performed the history, physical exam, assessment and plan and have also directed, reviewed and agreed to the documentation in the chart.

## 2019-11-01 NOTE — HISTORY OF PRESENT ILLNESS
[de-identified] : 64 year old F presents today for follow up evaluation of left hip and thigh pain 7 months s/p left THR. She reports mild, daily left hip area pain localized to the groin that occurs with activity. She states that her pain has gotten worse since her last office visit. She also reports back and thigh pain as well as numbness and tingling that radiates down the left leg. She reports right buttock pain. She denies fevers, chills. She has difficulty placing shoes on the left foot. Patient can walk less than 5 blocks with a moderate limp. She uses a rail to ascend and descend stairs. She can enter public transportation but can only sit comfortably in an ordinary chair for about 30 minutes.

## 2019-11-01 NOTE — PHYSICAL EXAM
[de-identified] : Well appearing. NAD. Alert and oriented x 3. No respiratory distress.\par Bilateral upper extremities: Skin intact. No deformity. Painless active ROM without evident restriction.\par Cervical, thoracic and lumbar spine: No visible deformity. Painless active ROM without evident restriction. No radicular pain on passive straight leg raise bilaterally.\par \par Pelvis: No pelvic obliquity. No tenderness.\par Leg lengths: Equal.\par \par Gait: Normal.\par Ambulatory assist devices: None.\par \par Right Hip:\par Skin intact. No surgical scars. No erythema. No ecchymosis. No swelling. No deformity. No focal tenderness.\par Painless ROM without evident restriction.\par No crepitation. No instability.\par \par Left Hip:\par Skin intact. (+) Well healed surgical scar. No erythema. No ecchymosis. No swelling. No deformity. (+) Tenderness to left-side SI joint.\par Mildly painful ROM from full extension to 90 degrees of flexion. 10 degrees of internal rotation. 40 degrees of external rotation. 45 degrees of abduction. 10 degrees of adduction. (+) Pain in left thigh on ROM.\par LAURA minimally painful. Impingement (FADIR) painless. Stinchfield mildly painful. \par No crepitation. No instability.\par Abductor power 5/5. Flexor power 5-/5, limited due to pain.\par \par Bilateral Knees: Skin intact. No surgical scars. No erythema or ecchymosis. No swelling or effusion. No deformity. Painless and unrestricted range of motion. Central patellar tracking. No crepitation. No instability. \par \par Neurological: Intact distal crude touch sensation. Normal distal motor power. \par Cardiovascular: Lower extremities warm and well perfused. No peripheral edema. [de-identified] : X-ray imaging of the AP pelvis and left hip done here today demonstrates well-fixed, well-aligned left THR.\par \par X-ray imaging of the lumbar spine done here today demonstrates mild multilevel degenerative changes, no deformity, no fracture or dislocation

## 2019-11-01 NOTE — DISCUSSION/SUMMARY
[de-identified] : Ms. Carrillo presents with left hip area and thigh pain 7 months s/p left THR. I ordered blood tests. I ordered an MRI of the spine. I may recommend that patient follow up with a spine surgeon or with physiatry pending MRI results. \par Follow up by calling the office for spine MRI results.

## 2019-11-05 ENCOUNTER — FORM ENCOUNTER (OUTPATIENT)
Age: 64
End: 2019-11-05

## 2019-11-06 ENCOUNTER — OUTPATIENT (OUTPATIENT)
Dept: OUTPATIENT SERVICES | Facility: HOSPITAL | Age: 64
LOS: 1 days | End: 2019-11-06
Payer: MEDICAID

## 2019-11-06 ENCOUNTER — APPOINTMENT (OUTPATIENT)
Dept: MRI IMAGING | Facility: HOSPITAL | Age: 64
End: 2019-11-06
Payer: MEDICAID

## 2019-11-06 DIAGNOSIS — Z98.891 HISTORY OF UTERINE SCAR FROM PREVIOUS SURGERY: Chronic | ICD-10-CM

## 2019-11-06 PROCEDURE — 72148 MRI LUMBAR SPINE W/O DYE: CPT | Mod: 26

## 2019-11-06 PROCEDURE — 72148 MRI LUMBAR SPINE W/O DYE: CPT

## 2019-12-09 ENCOUNTER — FORM ENCOUNTER (OUTPATIENT)
Age: 64
End: 2019-12-09

## 2019-12-10 ENCOUNTER — APPOINTMENT (OUTPATIENT)
Dept: ORTHOPEDIC SURGERY | Facility: CLINIC | Age: 64
End: 2019-12-10
Payer: MEDICAID

## 2019-12-10 ENCOUNTER — OUTPATIENT (OUTPATIENT)
Dept: OUTPATIENT SERVICES | Facility: HOSPITAL | Age: 64
LOS: 1 days | End: 2019-12-10
Payer: MEDICAID

## 2019-12-10 VITALS — WEIGHT: 135 LBS | HEIGHT: 57 IN | BODY MASS INDEX: 29.12 KG/M2

## 2019-12-10 DIAGNOSIS — M76.892 OTHER SPECIFIED ENTHESOPATHIES OF LEFT LOWER LIMB, EXCLUDING FOOT: ICD-10-CM

## 2019-12-10 DIAGNOSIS — Z98.891 HISTORY OF UTERINE SCAR FROM PREVIOUS SURGERY: Chronic | ICD-10-CM

## 2019-12-10 PROCEDURE — 73502 X-RAY EXAM HIP UNI 2-3 VIEWS: CPT | Mod: 26,LT

## 2019-12-10 PROCEDURE — 73502 X-RAY EXAM HIP UNI 2-3 VIEWS: CPT

## 2019-12-10 PROCEDURE — 99213 OFFICE O/P EST LOW 20 MIN: CPT

## 2019-12-10 RX ORDER — PANTOPRAZOLE 40 MG/1
40 TABLET, DELAYED RELEASE ORAL DAILY
Qty: 30 | Refills: 0 | Status: DISCONTINUED | COMMUNITY
Start: 2019-03-20 | End: 2019-12-10

## 2019-12-10 RX ORDER — TRAMADOL HYDROCHLORIDE 25 MG/1
TABLET, COATED ORAL
Refills: 0 | Status: DISCONTINUED | COMMUNITY
End: 2019-12-10

## 2019-12-10 RX ORDER — MELOXICAM 15 MG/1
15 TABLET ORAL
Qty: 30 | Refills: 1 | Status: ACTIVE | COMMUNITY
Start: 2019-12-10 | End: 1900-01-01

## 2019-12-10 NOTE — PHYSICAL EXAM
[de-identified] : Well appearing. NAD. Alert and oriented x 3. No respiratory distress.\par Bilateral upper extremities: Skin intact. No deformity. Painless active ROM without evident restriction.\par Cervical, thoracic and lumbar spine: No visible deformity. Painless active ROM without evident restriction. No radicular pain on passive straight leg raise bilaterally.\par \par Pelvis: No pelvic obliquity. No tenderness.\par Leg lengths: Equal.\par \par Gait: Normal.\par Ambulatory assist devices: None.\par \par Right Hip:\par Skin intact. No surgical scars. No erythema. No ecchymosis. No swelling. No deformity. \par Painless and unrestricted range of motion. \par No crepitation. No instability.\par \par Left Hip:\par Skin intact. (+) Well healed surgical scar. No erythema. No ecchymosis. No swelling. No deformity. No focal tenderness.\par Mildly painful ROM from full extension to 90 degrees of flexion. 0 degrees of internal rotation. 25-30 degrees of external rotation. 45 degrees of abduction. 0 degrees of adduction.\par LAURA painless. Impingement (FADIR) painless. Stinchfield painless. No crepitation. No instability.\par \par Bilateral Knees: Skin intact. No surgical scars. No erythema or ecchymosis. No swelling or effusion. No deformity. Painless and unrestricted range of motion. Central patellar tracking. No crepitation. No instability. \par \par Neurological: Intact distal crude touch sensation. Normal distal motor power. \par Cardiovascular: Lower extremities warm and well perfused. No peripheral edema. [de-identified] : X-ray imaging of the AP pelvis and left hip done here today demonstrates left hip with well-fixed GUMARO in overall good alignment\par \par MRI imaging of the lumbar spine done on 11/6/19 demonstrates:\par 1. Shallow right paracentral disc protrusion L1-L2 level with no significant foraminal stenosis or central canal stenosis. \par 2. Diffuse disc bulges L3-L4, L4-L5, and L5-S1 levels. \par 3. Central canal stenosis from L2-L3 level through to L4-L5 level inclusive which relates to acquired etiologies from facet arthropathy and disc disease, greatest at L4-L5 level.

## 2019-12-10 NOTE — REASON FOR VISIT
[Follow-Up Visit] : a follow-up visit for [Other: ____] : [unfilled] [ Service] : provided by  Service [FreeTextEntry1] : [id number] [FreeTextEntry2] : Mateus [TWNoteComboBox1] : [place]

## 2019-12-10 NOTE — END OF VISIT
[FreeTextEntry3] : All medical record entries made by Mustapha Rothman acting as a scribe for the performing provider (Baljeet Puckett MD and/or ROXANNA Caldera) on 12/10/2019. All entries were dictated to me by the performing medical provider. In signing this record, the medical provider affirms that they have personally performed the history, physical exam, assessment and plan and have also directed, reviewed and agreed to the documentation in the chart.

## 2019-12-10 NOTE — DISCUSSION/SUMMARY
[de-identified] : Ms. Carrillo presents with left hip area and thigh pain s/p left THR. I informed patient that according to labs and X-ray imaging, she does not have an infection at the hip joint or a mechanical problem with her hip replacement. I informed patient that I suspect her symptoms are from left hip tendinitis as well as arthritis and bulging discs in her spine, demonstrated on MRI. I wrote a physical therapy prescription and a prescription for an antiinflammatory. I referred patient to physiatrist, Dr. Powers, for further management of spine arthritis and pain. I also gave Ms. Carrillo the name of spine surgeon, Dr. Juarez. We discussed how if patient's pain doesn't improve, she could have an injection to the left hip bursa to see if that relieves some of her symptoms. \par Follow up in one year or sooner, PRN.

## 2020-01-15 ENCOUNTER — APPOINTMENT (OUTPATIENT)
Dept: PHYSICAL MEDICINE AND REHAB | Facility: CLINIC | Age: 65
End: 2020-01-15
Payer: MEDICAID

## 2020-01-15 VITALS
HEART RATE: 68 BPM | SYSTOLIC BLOOD PRESSURE: 120 MMHG | DIASTOLIC BLOOD PRESSURE: 70 MMHG | HEIGHT: 57 IN | OXYGEN SATURATION: 98 % | BODY MASS INDEX: 30.2 KG/M2 | WEIGHT: 140 LBS

## 2020-01-15 PROCEDURE — 99203 OFFICE O/P NEW LOW 30 MIN: CPT

## 2020-01-15 RX ORDER — CELECOXIB 200 MG/1
200 CAPSULE ORAL TWICE DAILY
Qty: 84 | Refills: 0 | Status: COMPLETED | COMMUNITY
Start: 2019-03-20 | End: 2020-01-15

## 2020-01-15 NOTE — REVIEW OF SYSTEMS
[FreeTextEntry1] : Constitutional: no chills, not feeling tired and no fever. \par Eyes: no discharge, no pain and no redness. \par ENT: no nasal discharge, no nosebleeds and no sore throat. \par Cardiovascular: no chest pain, no palpitations and the heart rate was not fast. \par Respiratory: no shortness of breath, no cough and no wheezing. \par Gastrointestinal: no abdominal pain, no diarrhea, no vomiting and no melena. \par Genitourinary: no pelvic pain, no dysuria, no abnormal urethral discharge and no frequency. \par Integumentary: no skin lesions and no change in a mole. \par Neurological: no dizziness, no fainting and no convulsions. \par Endocrine: no deepening of the voice and no hot flashes. \par Hematologic/Lymphatic: does not bleed easily, no swollen glands and no cervical lymphadenopathy. \par Musculoskeletal: as per HPI, otherwise denies additional joint pain, effusions, stiffness.\par All other review of systems are negative except as noted. \par  [Patient Intake Form Reviewed] : Patient intake form was reviewed

## 2020-01-15 NOTE — ASSESSMENT
[FreeTextEntry1] : Impression:\par 1. LLE numbness/weakness\par 2. Lumbar Spondylosis/Facet Syndrome\par \par Plan: After review of the history, physical examination, and imaging, the patient's low back symptoms are consistent with facet disease, however the LLE numbness/weakness do not correlate with MR findings. The imaging results and diagnosis were discussed in detail with the patient. We discussed all the potential treatment options including physical therapy, oral medication, interventional spine procedures, and surgery; as well as alternative therapeutics such as acupuncture and massage. At this time I am recommending that the patient go for Neurology for further evaluation/treatment of the LLE. With regard to her lower back, she is interested in interventional options and she would need to seek a pain management physician for further treatment. The patient will return to the office as needed. The patient expressed verbal understanding and is in agreement with the plan of care. All of the patient's questions and concerns were addressed during today's visit.

## 2020-01-15 NOTE — HISTORY OF PRESENT ILLNESS
[FreeTextEntry1] : Ms. ALLAN YOUNG is a very pleasant 64 year female who comes in for initial evaluation of low back pain that has been ongoing for more than ten years without any specific injury or inciting event. She underwent total left hip replacement in March 2019 and the pain on the left lower back and left leg became more evident than ever before. The pain is located primarily on the  left lower back radiating down the left lower extremity and feeling of numbness on her left leg/foot constant  in nature and described as tiredness, weakness,  numbness, and stiffness. The pain is rated as 9]/10 during today's visit, and ranges from 6-10/10. The patient's symptoms are aggravated by moving around  and alleviated by taking oral pain killers sometimes and sometimes nothing at all. . The patient denies any bowel/bladder dysfunction. The patient has no other complaints at this time.\par \par

## 2020-01-15 NOTE — DATA REVIEWED
[FreeTextEntry1] : MR LS 11/2019: Multilevel facet arthropathy. No evidence of neurocompressive pathology.

## 2020-01-15 NOTE — PHYSICAL EXAM
[FreeTextEntry1] : GEN: AAOx3, NAD.\par PSYCH: Normal mood and affect. Responds appropriately to commands.\par EYES: Sclerae Anicteric. No discharge. EOMI.\par RESP: Breathing unlabored.\par CV: DP pulses 2+ and equal. No varicosities noted.\par EXT: No C/C/E.\par SKIN: No lesions noted.\par STRENGTH: Poor effort LLE w/ give-way. \par TONE: Normal, No clonus.\par REFLEXES: Symmetric patella, medial hamstring, achilles. Plantars downgoing bilaterally.\par SENS: Decreased LT diffusely LLE. Otherwise grossly intact LT BLE\par INSP: Spine alignment is midline, with no evidence of scoliosis.\par STANCE: No Trendelenburg with single leg stance.\par GAIT: (+) antalgic, normal reciprocating heel to toe\par LUMBAR ROM: Flexion, extension, side-bending, rotation, oblique extension all full and pain free.  \par HIP ROM: Full and pain free bilaterally.\par PALP: There is no tenderness over the midline spinous processes, paravertebral muscles, SIJ, or greater trochanters bilaterally.\par SPECIAL: SLR and Slump test negative bilaterally.

## 2020-03-20 ENCOUNTER — APPOINTMENT (OUTPATIENT)
Dept: NEUROLOGY | Facility: CLINIC | Age: 65
End: 2020-03-20

## 2020-12-09 ENCOUNTER — APPOINTMENT (OUTPATIENT)
Dept: NEUROLOGY | Facility: CLINIC | Age: 65
End: 2020-12-09
Payer: MEDICARE

## 2020-12-09 VITALS
TEMPERATURE: 97.3 F | HEART RATE: 84 BPM | DIASTOLIC BLOOD PRESSURE: 67 MMHG | OXYGEN SATURATION: 98 % | BODY MASS INDEX: 31.29 KG/M2 | HEIGHT: 57 IN | SYSTOLIC BLOOD PRESSURE: 103 MMHG | WEIGHT: 145.05 LBS

## 2020-12-09 DIAGNOSIS — Z00.00 ENCOUNTER FOR GENERAL ADULT MEDICAL EXAMINATION W/OUT ABNORMAL FINDINGS: ICD-10-CM

## 2020-12-09 PROCEDURE — 99072 ADDL SUPL MATRL&STAF TM PHE: CPT

## 2020-12-09 PROCEDURE — 99205 OFFICE O/P NEW HI 60 MIN: CPT

## 2020-12-09 NOTE — DISCUSSION/SUMMARY
[FreeTextEntry1] : 66 y/o right handed  woman presents with ~5 yrs of LBP radiating to hip and buttock b/l with tingling/numbness and cramping in LLE. Neurologic exam significantly limited by poor effort/cooperation. She had hypersensivity with all DTR testing and given her degree of pain with such minor tactile stimuli, dx of fibromyalgia is high on ddx. She follows with a rheumatologist who has ordered other MRIs which are pending including Brain, Shoulder and Cervical spine. The description of her pain in a radicular fashion; however, warrants imaging to r/o lumbar radiculopathy. EMG/NCS can be helpful to r/o radiculopathy, neuropathy or myopathy given cramping. blood work to be checked as well. \par \par - MRI L spine w/wo c+ (prior MRI mentioned "thickening of cauda equina")\par -EMG/NCS\par - c/w pain meds\par - neuropathy labs as well as CK/ aldolase\par - PCP / rheum have ordered brain/shoulder/ cervical spine MRIs because of pain/weakness in R shoulder. Will obtain results once completed\par \par  F/u 6 weeks

## 2020-12-09 NOTE — DATA REVIEWED
[de-identified] : 11/2019 MRI showed R paracentral disc protrusion L1-2 with no sig foraminal stenosis or central canal stenosis.Diffuse disc bulges L3-4, L4-5 and L5-S1. Central canal stenosis from L2-3 thru L4-5 inclusive which relates to acquired etiologies from facet arthropathy and disc disease, greatest L4-5

## 2020-12-09 NOTE — HISTORY OF PRESENT ILLNESS
[FreeTextEntry1] : 64 y/o RHF ( SI # [id number])\par \par Referred by PMR for atypical LLE pain/weakness. 2019 MRI showed R paracentral disc protrusion L1-2 with no sig foraminal stenosis or central canal stenosis.Diffuse disc bulges L3-4, L4-5 and L5-S1. Central canal stenosis from L2-3 thru L4-5 inclusive which relates to acquired etiologies from facet arthropathy and disc disease, greatest L4-5\par \par Per patient has significant LBP/ Hip/ buttock pain. not one side more then the other. This has been going on for many years ~ 5 yrs. no clear provoking injury but says " she has falllen down many times" but not necessarily that a fall triggered symptoms in first place. She gets cramps in the legs if no GBP is taken. Feels it on the shins, L moreso. No weakness in legs. Does have tingling/numbness on LLE from the ankle down. No incontinence of urine or stool. \par Aggrav-not necessarily movement triggered although prolonged sitting worsens it\par Relieving- take meds and laying down\par med trials- meloxicam/ GBP/ tramadol appear to be effective for pain.\par \par \par Separate issue being worked up by Rheum- cannot elevate R arm due to R shoulder pain with abduction. Rheum gave meloxicam. She ordered brain/shoulder/cervical MRI which is pending.  Not an acute process. This progressed in last 2 months. She has some hand joint stiffness/swelling in hands. Rheum's name she does not know.\par \par PMHX-  HTN\par PSHX- , L hip replacement\par SocHx- , 3 kids, retired, no smoking, ETOH, or drug use, not driving\par FamHx- non contributory\par Allergies- NKDA\par Meds-atenolol, HCTZ, GBP 300mgBID, Meloxicam 15mg BID, Tylenol 650mg 4 x per day, Calcium, alendronate, tramadol 50mg  BID, omeprazole\par \par ROS- No F/C, no CP, no N/V, mood is up and down. All other ROS are negative. \par

## 2020-12-09 NOTE — PHYSICAL EXAM
[FreeTextEntry1] : MS- AAOX3, speech fluent, no dysarthria. Naming and repetition intact. Follows 3 step commands, remote and recent memory intact. Fund of knowledge full.\par CN- PERRLA, EOMI, VFFC, face symmetric, V1-V3 intact, tongue midline, palate symmetric, shoulder shrug symmetric, hearing grossly intact \par Fundi- poorly visualized\par Motor- poor effort throughout with pain limited R shoulder abduction. She is able to rise from chair unassisted suggestive of intact core/proximal strength\par Sensory- LT/Vib/PP/proprioception intact throughout  except for  dec PP patchy distrib in  hand b/l and dec cold/vib/prop in R  foot)\par Reflexes- hypersensitivity with DTR exam- Jumps with pain upon gentle tendon tapping throughout body, plantar response mute b/l\par Coordination- FTN intact, no tremor or dysmetria\par Gait- antalgic limps w/ R leg\par Romberg- neg\par musc- no hypertonicity

## 2020-12-14 LAB
ALBUMIN MFR SERPL ELPH: 57.1 %
ALBUMIN SERPL-MCNC: 4.6 G/DL
ALBUMIN/GLOB SERPL: 1.4 RATIO
ALDOLASE SERPL-CCNC: 4.5 U/L
ALPHA1 GLOB MFR SERPL ELPH: 3.7 %
ALPHA1 GLOB SERPL ELPH-MCNC: 0.3 G/DL
ALPHA2 GLOB MFR SERPL ELPH: 11.2 %
ALPHA2 GLOB SERPL ELPH-MCNC: 0.9 G/DL
ANION GAP SERPL CALC-SCNC: 13 MMOL/L
B-GLOBULIN MFR SERPL ELPH: 12.7 %
B-GLOBULIN SERPL ELPH-MCNC: 1 G/DL
BUN SERPL-MCNC: 7 MG/DL
CALCIUM SERPL-MCNC: 10.6 MG/DL
CHLORIDE SERPL-SCNC: 94 MMOL/L
CK SERPL-CCNC: 156 U/L
CO2 SERPL-SCNC: 28 MMOL/L
CREAT SERPL-MCNC: 0.81 MG/DL
ERYTHROCYTE [SEDIMENTATION RATE] IN BLOOD BY WESTERGREN METHOD: 12 MM/HR
ESTIMATED AVERAGE GLUCOSE: 123 MG/DL
GAMMA GLOB FLD ELPH-MCNC: 1.2 G/DL
GAMMA GLOB MFR SERPL ELPH: 15.3 %
GLUCOSE SERPL-MCNC: 97 MG/DL
HBA1C MFR BLD HPLC: 5.9 %
INTERPRETATION SERPL IEP-IMP: NORMAL
POTASSIUM SERPL-SCNC: 3.9 MMOL/L
PROT SERPL-MCNC: 8 G/DL
PROT SERPL-MCNC: 8 G/DL
SODIUM SERPL-SCNC: 135 MMOL/L
TSH SERPL-ACNC: 2.46 UIU/ML
VIT B12 SERPL-MCNC: 951 PG/ML

## 2020-12-15 ENCOUNTER — NON-APPOINTMENT (OUTPATIENT)
Age: 65
End: 2020-12-15

## 2021-01-20 ENCOUNTER — APPOINTMENT (OUTPATIENT)
Dept: NEUROLOGY | Facility: CLINIC | Age: 66
End: 2021-01-20
Payer: MEDICARE

## 2021-01-20 VITALS
OXYGEN SATURATION: 97 % | TEMPERATURE: 98.6 F | BODY MASS INDEX: 31.28 KG/M2 | HEART RATE: 67 BPM | DIASTOLIC BLOOD PRESSURE: 73 MMHG | SYSTOLIC BLOOD PRESSURE: 123 MMHG | WEIGHT: 145 LBS | HEIGHT: 57 IN

## 2021-01-20 DIAGNOSIS — M25.519 PAIN IN UNSPECIFIED SHOULDER: ICD-10-CM

## 2021-01-20 PROCEDURE — 99212 OFFICE O/P EST SF 10 MIN: CPT | Mod: 25

## 2021-01-20 PROCEDURE — 99072 ADDL SUPL MATRL&STAF TM PHE: CPT

## 2021-01-20 PROCEDURE — 95910 NRV CNDJ TEST 7-8 STUDIES: CPT

## 2021-01-20 PROCEDURE — 95886 MUSC TEST DONE W/N TEST COMP: CPT

## 2021-01-20 NOTE — HISTORY OF PRESENT ILLNESS
[FreeTextEntry1] : Referred by Dr. Castrejon for back and leg pain, bilateral but worse on the left\par She reports numbness and tingling in the left foot \par \par She also has pain in her right shoulder\par \par Symptoms have been present for years \par \par Also c/o numbness in left lateral thigh after hip replacement\par \par Reviewed: MRI L spine - congenitally narrow canal, few small disc bulges without significant canal or foraminal narrowing

## 2021-01-20 NOTE — ASSESSMENT
[FreeTextEntry1] : NCS/EMG unremarkable\par No polyneuropathy, myopathy or left lumbar radiculopathy\par There does not appear to be a neurologic etiology to her pain \par \par Left thigh numbness likely due to injury of lateral femoral cutaneous nerve either due to compression from weight, or complication of hip surgery \par \par See separate procedure note for full results of study.

## 2021-01-20 NOTE — PROCEDURE
[FreeTextEntry1] : Nerve Conduction and Electromyography Report [FreeTextEntry3] : Electro Physiologic Findings:\par \par Limb temperature was monitored and maintained at approximately 30 – 34° C in the lower extremities. \par \par The left sural and superficial fibular responses were normal. The right superficial fibular response was within normal limits but lower amplitude than the contralateral side, likely due to an osteophyte on the anterior dorsal foot. \par \par The fibular and tibial motor responses, including F-wave latencies, were normal bilaterally. \par \par Needle electromyography was performed on select left lower extremity L2-S2 appendicular muscles and the left L4/5 paraspinal. All muscles tested were unremarkable without evidence of active or chronic denervation, although many were suboptimally evaluated due to poor effort. \par \par Clinical Electrophysiological Impression: \par \par This electrodiagnostic study was unremarkable. There was no evidence of polyneuropathy, myopathy, or left lumbar radiculopathy.

## 2021-01-29 ENCOUNTER — OUTPATIENT (OUTPATIENT)
Dept: OUTPATIENT SERVICES | Facility: HOSPITAL | Age: 66
LOS: 1 days | End: 2021-01-29
Payer: COMMERCIAL

## 2021-01-29 ENCOUNTER — APPOINTMENT (OUTPATIENT)
Dept: MRI IMAGING | Facility: HOSPITAL | Age: 66
End: 2021-01-29

## 2021-01-29 DIAGNOSIS — Z98.891 HISTORY OF UTERINE SCAR FROM PREVIOUS SURGERY: Chronic | ICD-10-CM

## 2021-01-29 PROCEDURE — 72158 MRI LUMBAR SPINE W/O & W/DYE: CPT | Mod: 26

## 2021-01-29 PROCEDURE — 72158 MRI LUMBAR SPINE W/O & W/DYE: CPT

## 2021-01-29 PROCEDURE — A9585: CPT

## 2021-02-04 ENCOUNTER — NON-APPOINTMENT (OUTPATIENT)
Age: 66
End: 2021-02-04

## 2021-02-10 ENCOUNTER — APPOINTMENT (OUTPATIENT)
Dept: NEUROLOGY | Facility: CLINIC | Age: 66
End: 2021-02-10
Payer: MEDICARE

## 2021-02-10 VITALS
OXYGEN SATURATION: 96 % | HEART RATE: 61 BPM | TEMPERATURE: 97.7 F | SYSTOLIC BLOOD PRESSURE: 118 MMHG | DIASTOLIC BLOOD PRESSURE: 73 MMHG | HEIGHT: 57 IN

## 2021-02-10 PROCEDURE — 99214 OFFICE O/P EST MOD 30 MIN: CPT

## 2021-02-10 PROCEDURE — 99072 ADDL SUPL MATRL&STAF TM PHE: CPT

## 2021-02-11 ENCOUNTER — RESULT REVIEW (OUTPATIENT)
Age: 66
End: 2021-02-11

## 2021-02-11 ENCOUNTER — APPOINTMENT (OUTPATIENT)
Dept: SPINE | Facility: CLINIC | Age: 66
End: 2021-02-11
Payer: MEDICARE

## 2021-02-11 ENCOUNTER — OUTPATIENT (OUTPATIENT)
Dept: OUTPATIENT SERVICES | Facility: HOSPITAL | Age: 66
LOS: 1 days | End: 2021-02-11
Payer: COMMERCIAL

## 2021-02-11 ENCOUNTER — APPOINTMENT (OUTPATIENT)
Dept: SPINE | Facility: CLINIC | Age: 66
End: 2021-02-11

## 2021-02-11 VITALS
BODY MASS INDEX: 31.28 KG/M2 | HEART RATE: 52 BPM | TEMPERATURE: 97.3 F | OXYGEN SATURATION: 98 % | DIASTOLIC BLOOD PRESSURE: 62 MMHG | HEIGHT: 57 IN | RESPIRATION RATE: 12 BRPM | SYSTOLIC BLOOD PRESSURE: 99 MMHG | WEIGHT: 145 LBS

## 2021-02-11 DIAGNOSIS — Z98.891 HISTORY OF UTERINE SCAR FROM PREVIOUS SURGERY: Chronic | ICD-10-CM

## 2021-02-11 DIAGNOSIS — M48.02 SPINAL STENOSIS, CERVICAL REGION: ICD-10-CM

## 2021-02-11 DIAGNOSIS — M48.061 SPINAL STENOSIS, LUMBAR REGION WITHOUT NEUROGENIC CLAUDICATION: ICD-10-CM

## 2021-02-11 PROCEDURE — 72114 X-RAY EXAM L-S SPINE BENDING: CPT | Mod: 26

## 2021-02-11 PROCEDURE — 99204 OFFICE O/P NEW MOD 45 MIN: CPT

## 2021-02-11 PROCEDURE — 72052 X-RAY EXAM NECK SPINE 6/>VWS: CPT | Mod: 26

## 2021-02-11 PROCEDURE — 72114 X-RAY EXAM L-S SPINE BENDING: CPT

## 2021-02-11 PROCEDURE — 72052 X-RAY EXAM NECK SPINE 6/>VWS: CPT

## 2021-02-11 PROCEDURE — 99072 ADDL SUPL MATRL&STAF TM PHE: CPT

## 2021-02-12 PROBLEM — M48.061 LUMBAR STENOSIS: Status: ACTIVE | Noted: 2021-02-11

## 2021-02-12 PROBLEM — M48.02 CERVICAL STENOSIS OF SPINE: Status: ACTIVE | Noted: 2021-02-11

## 2021-02-12 NOTE — PHYSICAL EXAM
[General Appearance - Alert] : alert [General Appearance - In No Acute Distress] : in no acute distress [General Appearance - Well Nourished] : well nourished [General Appearance - Well-Appearing] : healthy appearing [Oriented To Time, Place, And Person] : oriented to person, place, and time [Affect] : the affect was normal [Impaired Insight] : insight and judgment were intact [Memory Recent] : recent memory was not impaired [Person] : oriented to person [Place] : oriented to place [Time] : oriented to time [4] : L2 Iliopsoas 4/5 [5] : S1 flexor hallucis longus 5/5 [Limited Balance] : the patient's balance was impaired [3+] : Patella left 3+ [2+] : Ankle jerk right 2+ [Antalgic] : antalgic [Romberg's Sign] : Romberg's sign was negtive [Beltran] : Beltran's sign was not demonstrated [FreeTextEntry8] : abnormal tandem gait [Able to toe walk] : the patient was not able to toe walk [Able to heel walk] : the patient was not able to heel walk

## 2021-02-12 NOTE — ASSESSMENT
[FreeTextEntry1] : PLAN\par - Xray C-spine 6 views today\par - RTC with copy of image for cervical MRI and shoulder MRI report (patient reports that she had MRI done at [facility] but no image/report is available today)\par

## 2021-02-12 NOTE — HISTORY OF PRESENT ILLNESS
[de-identified] : Patient is a 64 yo RH female with PMH of HTN, osteoporosis, OA (L hip, s/p L THR in 3/2019), lumbar herniated disc presents for neurosurgical evaluation.\par She reports progressively worsening back to leg pain for over 9-10 years initially started without significant injury/trauma. Pain is described as constant pulling sensation on her lower back radiating to b/l LE (L>R) with numbness causing significant balance/gait problem. She reports pain id worsening by change position from sitting to standing and walking (cannot walk more than one block) and unable to recall alleviating factors. As per patient, she noticed slightly worsening back to LLEG pain/numbness since she had L THR in 2019 and currently ambulates with cane for balance but denies falls. Additionally she c/o worsening neck to R shoulder/arm pain for 5-6 months causing significant difficulty in moving R arm but denies numbness/weakness/pain on her hands or difficulty performing ADLs. She denies BB dysfunction, saddle anesthesia. Currently she takes meloxicam/gabapentin with minimal improvement and she has not tried PT/pain management for injection trials.\par \par She was seen by neurology where MRI L-spine and EMG LE was completed which showed unremarkable stenosis and negative radiculopathy/neuropathy.

## 2021-02-12 NOTE — REASON FOR VISIT
[New Patient Visit] : a new patient visit [Referred By: _________] : Patient was referred by DAREN [[employer] ] : provided by [employer]   [FreeTextEntry1] : [id number] [FreeTextEntry2] : Tay [TWNoteComboBox1] : Portuguese

## 2021-02-12 NOTE — DATA REVIEWED
[de-identified] : L-spine wo on 1/29/2021, 11/6/2019 showed mild multilevel stenosis without thecal sac or nerve root compression [de-identified] : EMG LE on 1/20/2021

## 2021-03-23 ENCOUNTER — APPOINTMENT (OUTPATIENT)
Dept: SPINE | Facility: CLINIC | Age: 66
End: 2021-03-23
Payer: MEDICARE

## 2021-03-23 VITALS
BODY MASS INDEX: 31.28 KG/M2 | OXYGEN SATURATION: 98 % | HEIGHT: 57 IN | RESPIRATION RATE: 18 BRPM | SYSTOLIC BLOOD PRESSURE: 108 MMHG | DIASTOLIC BLOOD PRESSURE: 63 MMHG | TEMPERATURE: 98 F | HEART RATE: 75 BPM | WEIGHT: 145 LBS

## 2021-03-23 DIAGNOSIS — M54.2 CERVICALGIA: ICD-10-CM

## 2021-03-23 PROCEDURE — 99215 OFFICE O/P EST HI 40 MIN: CPT

## 2021-03-23 PROCEDURE — 99072 ADDL SUPL MATRL&STAF TM PHE: CPT

## 2021-03-24 PROBLEM — M54.2 CERVICALGIA: Status: ACTIVE | Noted: 2021-03-24

## 2021-03-24 NOTE — HISTORY OF PRESENT ILLNESS
[Stable] : stable [Daily] : ~He/She~ states the symptoms seem to be occuring daily [Lifting] : worsened by lifting

## 2021-03-24 NOTE — DISCUSSION/SUMMARY
[de-identified] : I think that her issues are related to a primary shoulder pathology.  She has been recommended for shoulder surgery and I think that she should get a second opinion.  I provided her with a few names.  We will remain available for her in the future. Render Post-Care Instructions In Note?: yes Detail Level: Detailed Consent: The patient's consent was obtained including but not limited to risks of crusting, scabbing, blistering, scarring, darker or lighter pigmentary change, recurrence, incomplete removal and infection. Duration Of Freeze Thaw-Cycle (Seconds): 3 Render Note In Bullet Format When Appropriate: No Number Of Freeze-Thaw Cycles: 3 freeze-thaw cycles Post-Care Instructions: I reviewed with the patient in detail post-care instructions. Patient is to wear sunprotection, and avoid picking at any of the treated lesions. Pt may apply Vaseline to crusted or scabbing areas. Medical Necessity Information: It is in your best interest to select a reason for this procedure from the list below. All of these items fulfill various CMS LCD requirements except the new and changing color options. Medical Necessity Clause: This procedure was medically necessary because the lesions that were treated were:

## 2021-03-24 NOTE — PHYSICAL EXAM
[Normal] : Gait: normal [Beltran's Sign] : negative Beltran's sign [] : Motor: [LE] : 5/5 motor strength in bilateral lower extremities

## 2021-06-09 ENCOUNTER — APPOINTMENT (OUTPATIENT)
Dept: NEUROLOGY | Facility: CLINIC | Age: 66
End: 2021-06-09
Payer: MEDICARE

## 2021-06-09 VITALS
SYSTOLIC BLOOD PRESSURE: 107 MMHG | DIASTOLIC BLOOD PRESSURE: 73 MMHG | HEIGHT: 57 IN | HEART RATE: 84 BPM | BODY MASS INDEX: 30.2 KG/M2 | OXYGEN SATURATION: 96 % | WEIGHT: 140 LBS | TEMPERATURE: 98 F

## 2021-06-09 PROCEDURE — 99072 ADDL SUPL MATRL&STAF TM PHE: CPT

## 2021-06-09 PROCEDURE — 99214 OFFICE O/P EST MOD 30 MIN: CPT

## 2021-06-21 ENCOUNTER — FORM ENCOUNTER (OUTPATIENT)
Age: 66
End: 2021-06-21

## 2021-09-08 ENCOUNTER — APPOINTMENT (OUTPATIENT)
Dept: NEUROLOGY | Facility: CLINIC | Age: 66
End: 2021-09-08
Payer: MEDICARE

## 2021-09-08 VITALS
DIASTOLIC BLOOD PRESSURE: 65 MMHG | OXYGEN SATURATION: 97 % | BODY MASS INDEX: 30.2 KG/M2 | HEART RATE: 67 BPM | SYSTOLIC BLOOD PRESSURE: 100 MMHG | HEIGHT: 57 IN | TEMPERATURE: 97.9 F | WEIGHT: 140 LBS

## 2021-09-08 PROCEDURE — 99072 ADDL SUPL MATRL&STAF TM PHE: CPT

## 2021-09-08 PROCEDURE — 99214 OFFICE O/P EST MOD 30 MIN: CPT

## 2021-10-29 ENCOUNTER — APPOINTMENT (OUTPATIENT)
Dept: OTOLARYNGOLOGY | Facility: CLINIC | Age: 66
End: 2021-10-29
Payer: MEDICARE

## 2021-10-29 VITALS
WEIGHT: 149 LBS | TEMPERATURE: 96.7 F | SYSTOLIC BLOOD PRESSURE: 109 MMHG | HEART RATE: 71 BPM | BODY MASS INDEX: 32.15 KG/M2 | DIASTOLIC BLOOD PRESSURE: 77 MMHG | HEIGHT: 57 IN

## 2021-10-29 DIAGNOSIS — R42 DIZZINESS AND GIDDINESS: ICD-10-CM

## 2021-10-29 DIAGNOSIS — H91.92 UNSPECIFIED HEARING LOSS, LEFT EAR: ICD-10-CM

## 2021-10-29 PROCEDURE — 92553 AUDIOMETRY AIR & BONE: CPT

## 2021-10-29 PROCEDURE — 92550 TYMPANOMETRY & REFLEX THRESH: CPT

## 2021-10-29 PROCEDURE — 99072 ADDL SUPL MATRL&STAF TM PHE: CPT

## 2021-10-29 PROCEDURE — 99204 OFFICE O/P NEW MOD 45 MIN: CPT

## 2021-10-29 NOTE — PROCEDURE
[FreeTextEntry3] : Rhinoscopy:\par sinonasal mucosal erythema\par clear mucus\par turbinate hypertrophy\par left septal deviation\par

## 2021-10-29 NOTE — PHYSICAL EXAM
[Midline] : trachea located in midline position [Normal] : no rashes [de-identified] : EAC clear, TM intact and mobile, ME clear

## 2021-10-29 NOTE — ASSESSMENT
[FreeTextEntry1] : 66F here for initial evaluation. For years, she c/o diminished left sided hearing and high pitched nonpulsatile ringing. There is no otorrhea, otalgia or vertigo. She also c/o loss of sense of smell for years. Lastly, she c/o 'dizziness' described  as wobbliness and unsteadiness on her feet. There is no room spinning.  All of these sx have been present for years unchanged. Audiogram from today shows asymmetric hearing, left side worse at all frequencies, as noted above. On exam, there is mild turbinate hypertrophy and clear mucus. The rest of the complete and comprehensive head and neck exam is unremarkable.\par Given the asymmetric hearing loss, will send for MRI IAC. Tinnitus due to left sided hearing loss. She has chronic rhinitis as well on exam. RTO after MRI for review.

## 2021-10-29 NOTE — HISTORY OF PRESENT ILLNESS
[de-identified] : 66F here for initial evaluation.\par \par For years, she c/o diminished left sided hearing and high pitched nonpulsatile ringing. There is no otorrhea, otalgia or vertigo. She also c/o loss of sense of smell for years. Lastly, she c/o 'dizziness' described  as wobbliness and unsteadiness on her feet. There is no room spinning. \par All of these sx have been present for years unchanged.\par \par Audiogram from today:\par R ear: moderately severe sloping to profound SNHL. type A tymp\par L ear: profound SNHL. type A tymp\par \par ROS otherwise unremarkable.

## 2021-11-12 ENCOUNTER — OUTPATIENT (OUTPATIENT)
Dept: OUTPATIENT SERVICES | Facility: HOSPITAL | Age: 66
LOS: 1 days | End: 2021-11-12
Payer: COMMERCIAL

## 2021-11-12 ENCOUNTER — APPOINTMENT (OUTPATIENT)
Dept: MRI IMAGING | Facility: HOSPITAL | Age: 66
End: 2021-11-12

## 2021-11-12 DIAGNOSIS — Z98.891 HISTORY OF UTERINE SCAR FROM PREVIOUS SURGERY: Chronic | ICD-10-CM

## 2021-11-12 PROCEDURE — 70553 MRI BRAIN STEM W/O & W/DYE: CPT

## 2021-11-12 PROCEDURE — A9585: CPT

## 2021-11-12 PROCEDURE — 70553 MRI BRAIN STEM W/O & W/DYE: CPT | Mod: 26

## 2022-02-03 ENCOUNTER — APPOINTMENT (OUTPATIENT)
Dept: NEUROLOGY | Facility: CLINIC | Age: 67
End: 2022-02-03

## 2022-04-12 NOTE — HISTORY OF PRESENT ILLNESS
Clonidine 0.1 mg electronic transmission to pharmacy failed. Contacted pharmacy and called in prescription per chart.   
[de-identified] : 64 year old F presents today for follow up evaluation of left hip area/thigh pain s/p left THR 3/20/19. She reports that she has posterior thigh pain and burning up to the buttock when she sits and that she has anterior thigh pain when she lifts her left leg. She describes her pain as moderate. She also reports pain down the left shin when she stands and states that she believes this is due to sciatica. She has difficulty placing shoes on the left foot. Patient can walk less than 5 blocks with a moderate limp. She uses a rail to ascend and descend stairs. She reports that she can enter public transportation but cannot sit comfortably in an ordinary chair. She has not been taking antiinflammatories for pain.\par At the patient's last visit, I ordered blood work labs which demonstrated ESR 27 and CRP 0.27. I also ordered an MRI which demonstrates disc bulges and spinal stenosis. Patient reports that she has not seen a specialist for her spine.

## 2022-06-13 ENCOUNTER — APPOINTMENT (OUTPATIENT)
Dept: ORTHOPEDIC SURGERY | Facility: CLINIC | Age: 67
End: 2022-06-13
Payer: MEDICARE

## 2022-06-13 DIAGNOSIS — M17.0 BILATERAL PRIMARY OSTEOARTHRITIS OF KNEE: ICD-10-CM

## 2022-06-13 PROCEDURE — 99214 OFFICE O/P EST MOD 30 MIN: CPT

## 2022-06-13 NOTE — HISTORY OF PRESENT ILLNESS
[de-identified] : Ms. Carrillo is a 66 year old female who comes in for evaluation of bilateral knee pain.  She also has pain all over her body including hand pain and back pain.\par She has numbness in her right hand and pain moving her hand.\par With her knees she has pain with walking.  She can only walk a few blocks.\par She has had a left total hip replacement in the past.  She gets tired and still has some hip pain.\par For pain she takes meloxicam, Tylenol and gabapentin.  She has seen Dr. Powers for her back pain.  She has also been seen by neurosurgery and neurology.\par It looks like she has not had nerve studies in the upper extremities yet.\par She does not go out very often because of all of her pain.  She is walking without any assistive devices.\par Her pain in the hands particularly on the right and numbness can be a 7 or 8 out of 10 and in her knees it is about a 7 out of 10.\par She does not want any injections and has never done any injections for her knees.  She has not been to physical therapy for her knee pain

## 2022-06-13 NOTE — PHYSICAL EXAM
[Normal RUE] : Right Upper Extremity: No scars, rashes, lesions, ulcers, skin intact [Normal LUE] : Left Upper Extremity: No scars, rashes, lesions, ulcers, skin intact [Normal RLE] : Right Lower Extremity: No scars, rashes, lesions, ulcers, skin intact [Normal LLE] : Left Lower Extremity: No scars, rashes, lesions, ulcers, skin intact [Normal] : Oriented to person, place, and time, insight and judgement were intact and the affect was normal [de-identified] : Bilateral knees\par She has pain changing position and getting out of the chair and lying down which mostly seems to be coming from her low back and not the knees.  Mildly antalgic gait.\par No significant knee effusion.  No edema, ecchymoses, erythema.\par Tender medial joint line bilateral knees.\par Intact extensor mechanism.\par Ia Lachman.  Negative anterior and posterior drawer.\par Normal varus and valgus laxity.\par Normal neurovascular exam\par \par  [de-identified] : \par On March 31, 2022 she had x-rays of bilateral knees and AP and lateral views showing mild to moderate joint space narrowing and osteophytes.  From the report it sounds like this was nonweightbearing.  She does not have the films with her but it would help for CVA x-ray films if she could get them otherwise we can get new x-rays in the future.\par She also had reports and hand x-rays which were without any arthritis.

## 2022-06-13 NOTE — ASSESSMENT
[FreeTextEntry1] : 66-year-old woman who has multiple joint issues.  She has had a left total hip replacement where she has pain.  She is has  bilateral knee mild to moderate osteoarthritis according to the x-ray report but seems consistent also with her exam.  She has chronic severe low back pain.  She also has hand pain and numbness in her hand.  The numbness may be from the cervical spine or perhaps she has carpal tunnel syndrome or both.\par For the knee arthritis I recommended some physical therapy and she can continue with the medications that she takes including the Tylenol and meloxicam.\par For her back she should follow-up with physiatry or the neurosurgeons.\par For hand numbness she should talk to the neurologist but also see the hand specialist.\par Follow-up in 6 to 8 weeks for her knees.

## 2022-06-13 NOTE — REASON FOR VISIT
[Initial Visit] : an initial visit for [Knee Pain] : knee pain [[employer] ] : provided by [employer]   [Time Spent: ____ minutes] : Total time spent using  services: [unfilled] minutes. The patient's primary language is not English thus required  services. [TWNoteComboBox1] : [place]ican

## 2022-06-21 ENCOUNTER — APPOINTMENT (OUTPATIENT)
Dept: NEUROLOGY | Facility: CLINIC | Age: 67
End: 2022-06-21
Payer: MEDICARE

## 2022-06-21 VITALS
BODY MASS INDEX: 31.5 KG/M2 | DIASTOLIC BLOOD PRESSURE: 63 MMHG | OXYGEN SATURATION: 98 % | TEMPERATURE: 98 F | WEIGHT: 146 LBS | HEART RATE: 65 BPM | HEIGHT: 57 IN | SYSTOLIC BLOOD PRESSURE: 102 MMHG

## 2022-06-21 DIAGNOSIS — R20.2 PARESTHESIA OF SKIN: ICD-10-CM

## 2022-06-21 PROCEDURE — 99215 OFFICE O/P EST HI 40 MIN: CPT

## 2022-08-08 ENCOUNTER — NON-APPOINTMENT (OUTPATIENT)
Age: 67
End: 2022-08-08

## 2022-08-15 ENCOUNTER — APPOINTMENT (OUTPATIENT)
Dept: ORTHOPEDIC SURGERY | Facility: CLINIC | Age: 67
End: 2022-08-15

## 2022-08-15 DIAGNOSIS — H93.12 TINNITUS, LEFT EAR: ICD-10-CM

## 2022-08-15 DIAGNOSIS — M48.07 SPINAL STENOSIS, LUMBOSACRAL REGION: ICD-10-CM

## 2022-08-15 NOTE — PHYSICAL EXAM
[Normal RUE] : Right Upper Extremity: No scars, rashes, lesions, ulcers, skin intact [Normal LUE] : Left Upper Extremity: No scars, rashes, lesions, ulcers, skin intact [Normal RLE] : Right Lower Extremity: No scars, rashes, lesions, ulcers, skin intact [Normal LLE] : Left Lower Extremity: No scars, rashes, lesions, ulcers, skin intact [Normal] : Oriented to person, place, and time, insight and judgement were intact and the affect was normal [de-identified] : Bilateral knees\par She has pain changing position and getting out of the chair and lying down which mostly seems to be coming from her low back and not the knees.  Mildly antalgic gait.\par No significant knee effusion.  No edema, ecchymoses, erythema.\par Tender medial joint line bilateral knees.\par Intact extensor mechanism.\par Ia Lachman.  Negative anterior and posterior drawer.\par Normal varus and valgus laxity.\par Normal neurovascular exam\par \par  [de-identified] : \par X-rays taken on 3/31/22 at an outside location of bilaterall knees showed mild to moderate joint space narrowing and osteophytes. From the report it sounds like it was nonweightbearing. She does not have films. \par \par Lumbar spine MRI November 6, 2019 showed multilevel degenerative disc disease with shallow right paracentral disc protrusion at L1-L2, diffuse disc bulges L3-L4, L4-L5 and L5-S1; and central canal stenosis from L2-3 level down through L4-5 level

## 2022-08-15 NOTE — HISTORY OF PRESENT ILLNESS
[de-identified] : Ms. Carrillo is a 66 year old female who comes in for evaluation of back and knee pain that started\par Pain is /10\par She is taking\par She saw neuro doctors in 2021 for neck pain\par She saw Dr. Powers for LEFT lumbar radiculopathy in Jan 2020\par She had a LEFT  THR with Dr. Puckett in March 2019

## 2022-09-21 ENCOUNTER — APPOINTMENT (OUTPATIENT)
Dept: NEUROLOGY | Facility: CLINIC | Age: 67
End: 2022-09-21

## 2022-09-21 VITALS
OXYGEN SATURATION: 98 % | HEIGHT: 57 IN | BODY MASS INDEX: 31.07 KG/M2 | HEART RATE: 56 BPM | DIASTOLIC BLOOD PRESSURE: 72 MMHG | WEIGHT: 144 LBS | TEMPERATURE: 96.7 F | SYSTOLIC BLOOD PRESSURE: 114 MMHG

## 2022-09-21 PROCEDURE — 99214 OFFICE O/P EST MOD 30 MIN: CPT

## 2022-09-21 RX ORDER — ACETAMINOPHEN 500 MG/1
TABLET ORAL
Refills: 0 | Status: DISCONTINUED | COMMUNITY
End: 2022-09-21

## 2022-09-22 ENCOUNTER — APPOINTMENT (OUTPATIENT)
Dept: NEUROLOGY | Facility: CLINIC | Age: 67
End: 2022-09-22

## 2022-09-22 VITALS
TEMPERATURE: 97 F | OXYGEN SATURATION: 97 % | WEIGHT: 144 LBS | SYSTOLIC BLOOD PRESSURE: 109 MMHG | HEIGHT: 57 IN | BODY MASS INDEX: 31.07 KG/M2 | HEART RATE: 58 BPM | DIASTOLIC BLOOD PRESSURE: 63 MMHG

## 2022-09-22 PROCEDURE — 95910 NRV CNDJ TEST 7-8 STUDIES: CPT

## 2022-09-22 PROCEDURE — 76882 US LMTD JT/FCL EVL NVASC XTR: CPT | Mod: RT

## 2022-09-22 PROCEDURE — 95885 MUSC TST DONE W/NERV TST LIM: CPT

## 2022-09-22 PROCEDURE — 99213 OFFICE O/P EST LOW 20 MIN: CPT | Mod: 25

## 2022-09-22 NOTE — PROCEDURE
[FreeTextEntry1] : \par Nerve Conduction, Electromyography and Neuromuscular Ultrasound Report\par \par  [FreeTextEntry3] : \par Electro Physiologic Findings:\par \par Limb temperature was monitored and maintained at approximately 32 – 36° C in the upper extremities.\par \par The right median sensory response was low amplitude and moderately slow, with complete conduction block across the wrist. The right median motor amplitude was mildly low, and the distal latency was prolonged without conduction block; the velocity in the forearm was reduced. The left median motor response was normal. The lumbrical studies were positive bilaterally, worse on the right. \par \par Needle electromyography was performed on the bilateral abductor pollicis brevis muscles. There was moderate active and mild chronic denervation on the right, while the left was normal. \par \par Neuromuscular Ultrasound was performed on the bilateral median nerves, using an [employer] Gamma with a linear high-frequency (12-19Hz) transducer probe. \par \par Clinical Electrophysiological Impression: \par \par This electrodiagnostic study demonstrated median nerve entrapments at the wrists bilaterally, severe on the right and mild on the left. The median nerves were focally enlarged at the wrists on ultrasound, worse on the right. These findings, along with her history and exam, are consistent with bilateral carpal tunnel syndrome worse on the right.

## 2022-09-22 NOTE — ASSESSMENT
[FreeTextEntry1] : History and exam consistent with carpal tunnel syndrome on the right\par NCS/EMG demonstrated b/l median nerve entrapments at the wrists, severe on the right and mild on the left\par Ultrasound demonstrated b/l focal enlargement of the median nerves at the wrists, worse on the right\par Referred to Dr. Leone for injection vs. surgical release\par \par See separate procedure note for full results of NCS/EMG study

## 2022-09-22 NOTE — PROCEDURE
[FreeTextEntry1] : \par Nerve Conduction, Electromyography and Neuromuscular Ultrasound Report\par \par  [FreeTextEntry3] : \par Electro Physiologic Findings:\par \par Limb temperature was monitored and maintained at approximately 32 – 36° C in the upper extremities.\par \par The right median sensory response was low amplitude and moderately slow, with complete conduction block across the wrist. The right median motor amplitude was mildly low, and the distal latency was prolonged without conduction block; the velocity in the forearm was reduced. The left median motor response was normal. The lumbrical studies were positive bilaterally, worse on the right. \par \par Needle electromyography was performed on the bilateral abductor pollicis brevis muscles. There was moderate active and mild chronic denervation on the right, while the left was normal. \par \par Neuromuscular Ultrasound was performed on the bilateral median nerves, using an [employer] Gamma with a linear high-frequency (12-19Hz) transducer probe. \par \par Clinical Electrophysiological Impression: \par \par This electrodiagnostic study demonstrated median nerve entrapments at the wrists bilaterally, severe on the right and mild on the left. The median nerves were focally enlarged at the wrists on ultrasound, worse on the right. These findings, along with her history and exam, are consistent with bilateral carpal tunnel syndrome worse on the right.

## 2022-09-22 NOTE — HISTORY OF PRESENT ILLNESS
[FreeTextEntry1] : Referred by Dr. Delaney for numbness in her right hand, worse when holding something or lying down in bed\par Also c/o right middle finger locking at times\par She's been wearing a brace at night with minimal relief\par \par Reviewed:\par Notes from Dr. Castrejon, neurosurgery

## 2022-10-27 ENCOUNTER — NON-APPOINTMENT (OUTPATIENT)
Age: 67
End: 2022-10-27

## 2022-10-27 ENCOUNTER — RESULT REVIEW (OUTPATIENT)
Age: 67
End: 2022-10-27

## 2022-10-27 ENCOUNTER — OUTPATIENT (OUTPATIENT)
Dept: OUTPATIENT SERVICES | Facility: HOSPITAL | Age: 67
LOS: 1 days | End: 2022-10-27
Payer: COMMERCIAL

## 2022-10-27 ENCOUNTER — APPOINTMENT (OUTPATIENT)
Dept: SPINE | Facility: CLINIC | Age: 67
End: 2022-10-27

## 2022-10-27 VITALS
WEIGHT: 144 LBS | BODY MASS INDEX: 31.07 KG/M2 | HEART RATE: 67 BPM | HEIGHT: 57 IN | OXYGEN SATURATION: 98 % | DIASTOLIC BLOOD PRESSURE: 70 MMHG | SYSTOLIC BLOOD PRESSURE: 109 MMHG | RESPIRATION RATE: 18 BRPM | TEMPERATURE: 97 F

## 2022-10-27 DIAGNOSIS — M54.12 DISEASE OF SPINAL CORD, UNSPECIFIED: ICD-10-CM

## 2022-10-27 DIAGNOSIS — G95.9 DISEASE OF SPINAL CORD, UNSPECIFIED: ICD-10-CM

## 2022-10-27 DIAGNOSIS — Z98.891 HISTORY OF UTERINE SCAR FROM PREVIOUS SURGERY: Chronic | ICD-10-CM

## 2022-10-27 PROCEDURE — 72114 X-RAY EXAM L-S SPINE BENDING: CPT

## 2022-10-27 PROCEDURE — 99215 OFFICE O/P EST HI 40 MIN: CPT

## 2022-10-27 PROCEDURE — 72052 X-RAY EXAM NECK SPINE 6/>VWS: CPT

## 2022-10-27 PROCEDURE — 72114 X-RAY EXAM L-S SPINE BENDING: CPT | Mod: 26

## 2022-10-27 PROCEDURE — 72052 X-RAY EXAM NECK SPINE 6/>VWS: CPT | Mod: 26

## 2022-10-27 NOTE — ASSESSMENT
[FreeTextEntry1] : PLAN\par - MRI C/L spine wo for worsening symptoms\par - XRay C/L spine 6 views today\par - PT for pain modality\par - RTC after images to review with Dr. Kincaid

## 2022-10-27 NOTE — PHYSICAL EXAM
[General Appearance - Alert] : alert [General Appearance - In No Acute Distress] : in no acute distress [General Appearance - Well Nourished] : well nourished [General Appearance - Well-Appearing] : healthy appearing [Oriented To Time, Place, And Person] : oriented to person, place, and time [Impaired Insight] : insight and judgment were intact [Affect] : the affect was normal [Memory Recent] : recent memory was not impaired [Person] : oriented to person [Place] : oriented to place [Time] : oriented to time [4] : L2 Iliopsoas 4/5 [5] : S1 flexor hallucis longus 5/5 [Limited Balance] : the patient's balance was impaired [3+] : Patella left 3+ [2+] : Ankle jerk left 2+ [Antalgic] : antalgic [Normal] : Gait: normal [] : Motor: [___/5] : left ([unfilled]/5) [LE Motor Strength NL] : Motor strength of the bilateral lower extremities was normal [Beltran's Sign] : positive Beltran's sign [1+] : left ankle jerk 1+ [de-identified] : MRI L-spine wo on 1/29/2021 in PACS mild multilevel DDD without thecal sac compression.\par  [Romberg's Sign] : Romberg's sign was negtive [Beltran] : Beltran's sign was not demonstrated [FreeTextEntry8] : abnormal tandem gait [Able to toe walk] : the patient was not able to toe walk [Able to heel walk] : the patient was not able to heel walk

## 2022-10-27 NOTE — DATA REVIEWED
[de-identified] : L-spine wo on 1/29/2021, 11/6/2019 showed mild multilevel stenosis without thecal sac or nerve root compression [de-identified] : EMG LE on 1/20/2021

## 2022-10-27 NOTE — REVIEW OF SYSTEMS
[Arm Weakness] : arm weakness [Leg Weakness] : leg weakness [Numbness] : numbness [Difficulty Walking] : difficulty walking [As Noted in HPI] : as noted in HPI [Negative] : Psychiatric

## 2022-10-27 NOTE — HISTORY OF PRESENT ILLNESS
[de-identified] : INITIAL history\par Patient is a 66 yo RH female with PMH of HTN, osteoporosis, OA (L hip, s/p L THR in 3/2019), lumbar herniated disc presents for neurosurgical evaluation.\par She reports progressively worsening back to leg pain for over 9-10 years initially started without significant injury/trauma. Pain is described as constant pulling sensation on her lower back radiating to b/l LE (L>R) with numbness causing significant balance/gait problem. She reports pain id worsening by change position from sitting to standing and walking (cannot walk more than one block) and unable to recall alleviating factors. As per patient, she noticed slightly worsening back to LLEG pain/numbness since she had L THR in 2019 and currently ambulates with cane for balance but denies falls. Additionally she c/o worsening neck to R shoulder/arm pain for 5-6 months causing significant difficulty in moving R arm but denies numbness/weakness/pain on her hands or difficulty performing ADLs. She denies BB dysfunction, saddle anesthesia. Currently she takes meloxicam/gabapentin with minimal improvement and she has not tried PT/pain management for injection trials.\par She was seen by neurology where MRI L-spine and EMG LE was completed which showed unremarkable stenosis and negative radiculopathy/neuropathy. \par \par Today she returns c/o worsening chronic back to b/l LE (R>L) pain for several month. She has been taking NSAIDs/Tylenol/gabapentin without improvement. Additionally she continues to have L shoulder pain and limited ROM, weakness on L hand/arm which has not been changed since her last visit. She denies BB dysfunction, balance problem, new/worsening weakness on upper/lower extremities. She ambulates without assistive device.

## 2022-11-15 ENCOUNTER — OUTPATIENT (OUTPATIENT)
Dept: OUTPATIENT SERVICES | Facility: HOSPITAL | Age: 67
LOS: 1 days | End: 2022-11-15
Payer: COMMERCIAL

## 2022-11-15 ENCOUNTER — APPOINTMENT (OUTPATIENT)
Dept: MRI IMAGING | Facility: HOSPITAL | Age: 67
End: 2022-11-15

## 2022-11-15 DIAGNOSIS — Z98.891 HISTORY OF UTERINE SCAR FROM PREVIOUS SURGERY: Chronic | ICD-10-CM

## 2022-11-15 PROCEDURE — 72148 MRI LUMBAR SPINE W/O DYE: CPT | Mod: 26

## 2022-11-15 PROCEDURE — 72148 MRI LUMBAR SPINE W/O DYE: CPT

## 2022-11-15 PROCEDURE — 72141 MRI NECK SPINE W/O DYE: CPT

## 2022-11-15 PROCEDURE — 72141 MRI NECK SPINE W/O DYE: CPT | Mod: 26

## 2022-11-21 ENCOUNTER — NON-APPOINTMENT (OUTPATIENT)
Age: 67
End: 2022-11-21

## 2022-12-15 ENCOUNTER — APPOINTMENT (OUTPATIENT)
Dept: NEUROLOGY | Facility: CLINIC | Age: 67
End: 2022-12-15

## 2023-01-12 ENCOUNTER — APPOINTMENT (OUTPATIENT)
Dept: SPINE | Facility: CLINIC | Age: 68
End: 2023-01-12
Payer: MEDICARE

## 2023-01-12 VITALS
OXYGEN SATURATION: 98 % | RESPIRATION RATE: 18 BRPM | DIASTOLIC BLOOD PRESSURE: 66 MMHG | HEART RATE: 69 BPM | BODY MASS INDEX: 31.07 KG/M2 | HEIGHT: 57 IN | WEIGHT: 144 LBS | SYSTOLIC BLOOD PRESSURE: 110 MMHG | TEMPERATURE: 97 F

## 2023-01-12 DIAGNOSIS — M79.604 PAIN IN RIGHT LEG: ICD-10-CM

## 2023-01-12 DIAGNOSIS — M16.12 UNILATERAL PRIMARY OSTEOARTHRITIS, LEFT HIP: ICD-10-CM

## 2023-01-12 DIAGNOSIS — M79.605 PAIN IN RIGHT LEG: ICD-10-CM

## 2023-01-12 PROCEDURE — 99213 OFFICE O/P EST LOW 20 MIN: CPT

## 2023-01-12 NOTE — REASON FOR VISIT
[Follow-Up Visit] : a follow-up visit for [Spinal Stenosis] : spinal stenosis [[employer] ] : provided by [employer]   [Interpreters_IDNumber] : [id number] [TWNoteComboBox1] : [place]

## 2023-01-12 NOTE — PHYSICAL EXAM
[Normal] : Gait: normal [Beltran's Sign] : negative Beltran's sign [] : Motor: [UE Motor Strength NL] : Motor strength of the bilateral upper extremities is normal [LE Motor Strength NL] : Motor strength of the bilateral lower extremities was normal [2+] : left ankle jerk 2+ [de-identified] : MR C/L spine wo on 11/15/22 in PACS showed unremarkable.\par Xray C/L spine 6 views on 10/27/2022 in PACS showed no motion dynamic/fractures.

## 2023-01-12 NOTE — ASSESSMENT
[FreeTextEntry1] : Images were reviewed by Dr. Velasquez. Unremarkable images.\par \par PLAN\par - PT\par - pain management referral\par - RTC PRN

## 2023-01-12 NOTE — HISTORY OF PRESENT ILLNESS
[de-identified] : INITIAL history\par Patient is a 64 yo RH female with PMH of HTN, osteoporosis, OA (L hip, s/p L THR in 3/2019), lumbar herniated disc presents for neurosurgical evaluation.\par She reports progressively worsening back to leg pain for over 9-10 years initially started without significant injury/trauma. Pain is described as constant pulling sensation on her lower back radiating to b/l LE (L>R) with numbness causing significant balance/gait problem. She reports pain id worsening by change position from sitting to standing and walking (cannot walk more than one block) and unable to recall alleviating factors. As per patient, she noticed slightly worsening back to LLEG pain/numbness since she had L THR in 2019 and currently ambulates with cane for balance but denies falls. Additionally she c/o worsening neck to R shoulder/arm pain for 5-6 months causing significant difficulty in moving R arm but denies numbness/weakness/pain on her hands or difficulty performing ADLs. She denies BB dysfunction, saddle anesthesia. Currently she takes meloxicam/gabapentin with minimal improvement and she has not tried PT/pain management for injection trials.\par She was seen by neurology where MRI L-spine and EMG LE was completed which showed unremarkable stenosis and negative radiculopathy/neuropathy. \par \par FOLLOW UP visit (10/27/2022)\par she returns c/o worsening chronic back to b/l LE (R>L) pain for several month. She has been taking NSAIDs/Tylenol/gabapentin without improvement. Additionally she continues to have L shoulder pain and limited ROM, weakness on L hand/arm which has not been changed since her last visit. She denies BB dysfunction, balance problem, new/worsening weakness on upper/lower extremities. She ambulates without assistive device. \par \par Today she returns after completing MRI C/L spine and XRay C/L spine and to review. She continues to have moderate pain on the same distribution but denies new/worsening focal neuro deficits. She has been taking OTC (NSAIDs) and tried PT 3 times without relief.

## 2023-01-24 ENCOUNTER — APPOINTMENT (OUTPATIENT)
Dept: NEUROLOGY | Facility: CLINIC | Age: 68
End: 2023-01-24
Payer: MEDICARE

## 2023-01-24 VITALS
OXYGEN SATURATION: 95 % | WEIGHT: 150 LBS | DIASTOLIC BLOOD PRESSURE: 73 MMHG | SYSTOLIC BLOOD PRESSURE: 124 MMHG | TEMPERATURE: 97.9 F | HEART RATE: 62 BPM | BODY MASS INDEX: 32.36 KG/M2 | HEIGHT: 57 IN

## 2023-01-24 PROCEDURE — 99215 OFFICE O/P EST HI 40 MIN: CPT

## 2023-02-16 ENCOUNTER — APPOINTMENT (OUTPATIENT)
Dept: ORTHOPEDIC SURGERY | Facility: CLINIC | Age: 68
End: 2023-02-16

## 2023-05-30 ENCOUNTER — APPOINTMENT (OUTPATIENT)
Dept: NEUROLOGY | Facility: CLINIC | Age: 68
End: 2023-05-30
Payer: MEDICARE

## 2023-05-30 VITALS
DIASTOLIC BLOOD PRESSURE: 72 MMHG | OXYGEN SATURATION: 98 % | HEART RATE: 75 BPM | SYSTOLIC BLOOD PRESSURE: 112 MMHG | TEMPERATURE: 97.3 F

## 2023-05-30 VITALS — WEIGHT: 148 LBS | HEIGHT: 57 IN | BODY MASS INDEX: 31.93 KG/M2

## 2023-05-30 PROCEDURE — 99214 OFFICE O/P EST MOD 30 MIN: CPT

## 2023-06-01 ENCOUNTER — APPOINTMENT (OUTPATIENT)
Dept: ORTHOPEDIC SURGERY | Facility: CLINIC | Age: 68
End: 2023-06-01
Payer: MEDICARE

## 2023-06-01 ENCOUNTER — OUTPATIENT (OUTPATIENT)
Dept: OUTPATIENT SERVICES | Facility: HOSPITAL | Age: 68
LOS: 1 days | End: 2023-06-01
Payer: COMMERCIAL

## 2023-06-01 ENCOUNTER — RESULT REVIEW (OUTPATIENT)
Age: 68
End: 2023-06-01

## 2023-06-01 PROCEDURE — 73110 X-RAY EXAM OF WRIST: CPT

## 2023-06-01 PROCEDURE — 73110 X-RAY EXAM OF WRIST: CPT | Mod: 26,RT

## 2023-06-01 PROCEDURE — 99213 OFFICE O/P EST LOW 20 MIN: CPT

## 2023-06-01 NOTE — DISCUSSION/SUMMARY
[de-identified] : All medical record entries made by "residents name" acting as a scribe for this encounter under the direction of "Dr. Cardenas." I have reviewed the chart and agree that the record accurately reflects my personal performance of the history, physical exam, assessment and plan. I have also personally directed, reviewed and agreed with the chart.\par

## 2023-06-01 NOTE — PHYSICAL EXAM
[de-identified] : General: Resting comfortably, NAD, AOx3\par RUE: Mild thenar atrophy, otherwise no gross deformity. TTP over long finger A1 pulley with triggering appreciated while moving finger from flexion to extension. APIN/PIN/U motor intact though with weakness of FPL compared to contralateral. Sensation grossly decreased in median nerve distribution compared to LUE. 2+ radial pulse, capillary refill <3 sec\par LUE: No gross deformity. SILT throughout. AIN/PIN/U motor intact. Capillary refill <3 sec.

## 2023-06-01 NOTE — ASSESSMENT
[FreeTextEntry1] : 67F with R carpal tunnel and R long finger trigger finger presenting with symptoms refractory to conservative management and confirmatory diagnostic studies demonstrating severe R carpal tunnel syndrome. We discussed that treatment at this time given failure of conservative management would involve surgical intervention to release the carpal tunnel with the goal of preventing symptom exacerbation, though there is the possibility that she regains some sensory and motor function postoperatively. We discussed that we can also perform a concurrent R long trigger finger release. She is agreeable to proceeding with surgical intervention. She understands she will have to see her primary care physician for medical clearance. We will plan to schedule the surgery next month in July. Patient agreeable with plan, all questions addressed.

## 2023-06-01 NOTE — HISTORY OF PRESENT ILLNESS
[de-identified] : 67F PMH HTN presenting for evaluation of R carpal tunnel. She states she has had numbness and tingling in her radial 3 digits for several months which have been refractory to bracing. She frequently awakens at night with dense numbness in the hand. She recently had an EMG study confirming severe R carpal tunnel syndrome and mild L carpal tunnel syndrome. She also endorses triggering and locking of the R long finger, which decreases her ability to use the hand. Denies any neck pain or radiating symptoms.  Denies any symptoms in the L upper extremity.

## 2023-06-02 ENCOUNTER — OUTPATIENT (OUTPATIENT)
Dept: OUTPATIENT SERVICES | Facility: HOSPITAL | Age: 68
LOS: 1 days | End: 2023-06-02
Payer: COMMERCIAL

## 2023-06-02 ENCOUNTER — RESULT REVIEW (OUTPATIENT)
Age: 68
End: 2023-06-02

## 2023-06-02 ENCOUNTER — APPOINTMENT (OUTPATIENT)
Dept: ORTHOPEDIC SURGERY | Facility: CLINIC | Age: 68
End: 2023-06-02
Payer: MEDICARE

## 2023-06-02 VITALS
WEIGHT: 148 LBS | HEART RATE: 74 BPM | BODY MASS INDEX: 31.93 KG/M2 | OXYGEN SATURATION: 95 % | HEIGHT: 57 IN | DIASTOLIC BLOOD PRESSURE: 80 MMHG | SYSTOLIC BLOOD PRESSURE: 132 MMHG

## 2023-06-02 DIAGNOSIS — Z82.61 FAMILY HISTORY OF ARTHRITIS: ICD-10-CM

## 2023-06-02 DIAGNOSIS — Z80.9 FAMILY HISTORY OF MALIGNANT NEOPLASM, UNSPECIFIED: ICD-10-CM

## 2023-06-02 DIAGNOSIS — Z72.3 LACK OF PHYSICAL EXERCISE: ICD-10-CM

## 2023-06-02 DIAGNOSIS — Z78.9 OTHER SPECIFIED HEALTH STATUS: ICD-10-CM

## 2023-06-02 DIAGNOSIS — Z47.1 AFTERCARE FOLLOWING JOINT REPLACEMENT SURGERY: ICD-10-CM

## 2023-06-02 DIAGNOSIS — Z96.642 AFTERCARE FOLLOWING JOINT REPLACEMENT SURGERY: ICD-10-CM

## 2023-06-02 DIAGNOSIS — Z86.39 PERSONAL HISTORY OF OTHER ENDOCRINE, NUTRITIONAL AND METABOLIC DISEASE: ICD-10-CM

## 2023-06-02 PROCEDURE — 99214 OFFICE O/P EST MOD 30 MIN: CPT

## 2023-06-02 PROCEDURE — 73521 X-RAY EXAM HIPS BI 2 VIEWS: CPT | Mod: 26

## 2023-06-02 PROCEDURE — 72020 X-RAY EXAM OF SPINE 1 VIEW: CPT

## 2023-06-02 PROCEDURE — 73521 X-RAY EXAM HIPS BI 2 VIEWS: CPT

## 2023-06-02 PROCEDURE — 72020 X-RAY EXAM OF SPINE 1 VIEW: CPT | Mod: 26

## 2023-06-02 NOTE — REVIEW OF SYSTEMS
[Joint Pain] : joint pain [Joint Stiffness] : joint stiffness [Joint Swelling] : joint swelling [Feeling Tired] : fatigue [Decrease Hearing] : decrease hearing [Constipation] : constipation [Dizziness] : dizziness [Depression] : depression [Sleep Disturbances] : ~T sleep disturbances [Negative] : Heme/Lymph

## 2023-06-05 PROBLEM — Z82.61 FAMILY HISTORY OF ARTHRITIS: Status: ACTIVE | Noted: 2023-06-02

## 2023-06-05 PROBLEM — Z72.3 DOES NOT EXERCISE: Status: ACTIVE | Noted: 2023-06-02

## 2023-06-05 PROBLEM — Z80.9 FAMILY HISTORY OF MALIGNANT NEOPLASM: Status: ACTIVE | Noted: 2023-06-02

## 2023-06-05 PROBLEM — Z86.39 HISTORY OF HIGH CHOLESTEROL: Status: RESOLVED | Noted: 2023-06-02 | Resolved: 2023-06-05

## 2023-06-05 PROBLEM — Z78.9 NON-SMOKER: Status: ACTIVE | Noted: 2023-06-02

## 2023-06-05 NOTE — END OF VISIT
[FreeTextEntry3] : All medical record entries made by the Scribe were at my, Dr. Josh Salazar, direction and personally dictated by me on 06/02/2023. I have reviewed the chart and agree that the record accurately reflects my personal performance of the history, physical exam, assessment and plan. I have also personally directed, reviewed, and agreed with the chart.

## 2023-06-05 NOTE — PHYSICAL EXAM
[de-identified] : General appearance: well nourished and hydrated, pleasant, alert and oriented x 3, cooperative. \par HEENT: normocephalic, EOM intact, wearing mask, external auditory canal clear.  \par Cardiovascular: no lower leg edema, no varicosities, dorsalis pedis pulses palpable and symmetric.  \par Lymphatics: no palpable lymphadenopathy, no lymphedema.  \par Neurologic: sensation is normal, no muscle weakness in upper or lower extremities, patella tendon reflexes present and symmetric.  \par Dermatologic: skin moist, warm, no rash.  \par Spine: cervical spine with normal lordosis and painless range of motion, thoracic spine with normal kyphosis and painless range of motion, lumbosacral spine with normal lordosis and painless range of motion.  No tenderness to palpation along midline spine and paraspinal musculature.  Sacroiliac joints nontender bilaterally. Negative SLR and crossed SLR tests bilaterally.\par Gait: no assistive device, demonstrates b/l antalgia\par \par Limb lengths: clinically RLE approximately 5 mm shorter than LLE\par \par Left hip:\par - Focal soft tissue swelling: none\par - Ecchymosis: none\par - Erythema: none\par - Wounds: well healed anterior incision, benign appearing \par - Tenderness: marked lateral trochanteric TTP\par - ROM: \par   - Flexion: 90\par   - Extension: 0\par   - Adduction: 10\par   - Abduction: 30\par   - Internal rotation in 90 degrees of hip flexion: 15\par   - External rotation in 90 degrees of hip flexion: 30\par - LAURA: negative\par - FADIR: negative\par - Toney: negative\par - Stinchfield: negative\par - Flexor power: 4/5\par - Abductor power: 4/5, effort very poor\par \par Right hip: \par - Focal soft tissue swelling: none\par - Ecchymosis: none\par - Erythema: none\par - Wounds: none\par - Tenderness: mild lateral trochanteric TTP\par - ROM: \par   - Flexion: 90\par   - Extension: 0\par   - Adduction: 10\par   - Abduction: 20\par   - Internal rotation in 90 degrees of hip flexion: 10\par   - External rotation in 90 degrees of hip flexion: 15\par - LAURA: positive \par - FADIR: positive \par - Toney: negative\par - Stinchfield: negative\par - Flexor power: 4/5, notably poor effort\par - Abductor power: 4/5, notably poor effort [de-identified] : A lateral view of the lumbosacral spine, weightbearing AP pelvis, and 2 additional views (frog lateral and false profile) of the bilateral hips were interpreted by me and reviewed with the patient.\par \par Location of imaging: [facility] \par Date of exam: 06/02/2023\par \par Lumbar spine --\par Alignment: normal\par Spondylosis: mild to moderate multilevel spondylosis with some bridging anterior calcifications between L3 and L4\par Listhesis: none\par Posterior elements: mild multilevel facet arthrosis\par \par Pelvic alignment: normal\par \par Right hip --\par Arthritis: mild OA, Tonnis 1 with predominantly inferomedial joint space narrowing\par Deformity: coxa vara\par Osteonecrosis: none\par \par Left hip -- GUMARO in position. All components appear to be well fixed and in reasonable alignment without evidence of mechanical complication.\par

## 2023-06-05 NOTE — HISTORY OF PRESENT ILLNESS
[6] : a current pain level of 6/10 [Walking] : worsened by walking [NSAIDs] : relieved by nonsteroidal anti-inflammatory drugs [Rest] : relieved by rest [de-identified] : Left GUMARO, 2019 by Dr. Puckett\par \par 06/02/2023: 66 y/o female presenting for evaluation of b/l hip pain. She states she has had hip pain for 1 year, primarily of the right side. She had a left hip GUMARO in 2019 by Dr. Puckett. She reports lateral numbness of the left hip as well. She mainly complains of right lateral hip pain without radiation, though she occasionally has groin pain with walking. She takes gabapentin, Tylenol, and meloxicam as needed for pain. She has not participated in PT. She ambulates with a cane and does not endorse any walking distance limitation. \par \par PMHx includes HTN for which she is on medication. She denies allergies.  [de-identified] : pt states painis shooting

## 2023-06-05 NOTE — HISTORY OF PRESENT ILLNESS
[6] : a current pain level of 6/10 [Walking] : worsened by walking [NSAIDs] : relieved by nonsteroidal anti-inflammatory drugs [Rest] : relieved by rest [de-identified] : Left GUMARO, 2019 by Dr. Puckett\par \par 06/02/2023: 68 y/o female presenting for evaluation of b/l hip pain. She states she has had hip pain for 1 year, primarily of the right side. She had a left hip GUMARO in 2019 by Dr. Puckett. She reports lateral numbness of the left hip as well. She mainly complains of right lateral hip pain without radiation, though she occasionally has groin pain with walking. She takes gabapentin, Tylenol, and meloxicam as needed for pain. She has not participated in PT. She ambulates with a cane and does not endorse any walking distance limitation. \par \par PMHx includes HTN for which she is on medication. She denies allergies.  [de-identified] : pt states painis shooting

## 2023-06-05 NOTE — PHYSICAL EXAM
[de-identified] : General appearance: well nourished and hydrated, pleasant, alert and oriented x 3, cooperative. \par HEENT: normocephalic, EOM intact, wearing mask, external auditory canal clear.  \par Cardiovascular: no lower leg edema, no varicosities, dorsalis pedis pulses palpable and symmetric.  \par Lymphatics: no palpable lymphadenopathy, no lymphedema.  \par Neurologic: sensation is normal, no muscle weakness in upper or lower extremities, patella tendon reflexes present and symmetric.  \par Dermatologic: skin moist, warm, no rash.  \par Spine: cervical spine with normal lordosis and painless range of motion, thoracic spine with normal kyphosis and painless range of motion, lumbosacral spine with normal lordosis and painless range of motion.  No tenderness to palpation along midline spine and paraspinal musculature.  Sacroiliac joints nontender bilaterally. Negative SLR and crossed SLR tests bilaterally.\par Gait: no assistive device, demonstrates b/l antalgia\par \par Limb lengths: clinically RLE approximately 5 mm shorter than LLE\par \par Left hip:\par - Focal soft tissue swelling: none\par - Ecchymosis: none\par - Erythema: none\par - Wounds: well healed anterior incision, benign appearing \par - Tenderness: marked lateral trochanteric TTP\par - ROM: \par   - Flexion: 90\par   - Extension: 0\par   - Adduction: 10\par   - Abduction: 30\par   - Internal rotation in 90 degrees of hip flexion: 15\par   - External rotation in 90 degrees of hip flexion: 30\par - LAURA: negative\par - FADIR: negative\par - Toney: negative\par - Stinchfield: negative\par - Flexor power: 4/5\par - Abductor power: 4/5, effort very poor\par \par Right hip: \par - Focal soft tissue swelling: none\par - Ecchymosis: none\par - Erythema: none\par - Wounds: none\par - Tenderness: mild lateral trochanteric TTP\par - ROM: \par   - Flexion: 90\par   - Extension: 0\par   - Adduction: 10\par   - Abduction: 20\par   - Internal rotation in 90 degrees of hip flexion: 10\par   - External rotation in 90 degrees of hip flexion: 15\par - LAURA: positive \par - FADIR: positive \par - Toney: negative\par - Stinchfield: negative\par - Flexor power: 4/5, notably poor effort\par - Abductor power: 4/5, notably poor effort [de-identified] : A lateral view of the lumbosacral spine, weightbearing AP pelvis, and 2 additional views (frog lateral and false profile) of the bilateral hips were interpreted by me and reviewed with the patient.\par \par Location of imaging: [facility] \par Date of exam: 06/02/2023\par \par Lumbar spine --\par Alignment: normal\par Spondylosis: mild to moderate multilevel spondylosis with some bridging anterior calcifications between L3 and L4\par Listhesis: none\par Posterior elements: mild multilevel facet arthrosis\par \par Pelvic alignment: normal\par \par Right hip --\par Arthritis: mild OA, Tonnis 1 with predominantly inferomedial joint space narrowing\par Deformity: coxa vara\par Osteonecrosis: none\par \par Left hip -- GUMARO in position. All components appear to be well fixed and in reasonable alignment without evidence of mechanical complication.\par

## 2023-06-05 NOTE — ADDENDUM
[FreeTextEntry1] : Documented by Georgiana Barrientos acting as a scribe for Dr. Josh Salazar on 06/02/2023.

## 2023-06-05 NOTE — DISCUSSION/SUMMARY
[de-identified] : 68 y/o female with b/l hip trochanteric bursitis and mild right hip OA as well as left GUMARO in the setting of chronic pain and deconditioning. \par - We will begin conservative management at this time including PT, HEP, and I encouraged her to continue with her current multimodal pain management regimen. \par - F/u after PT with no new XR needed. If she has not experienced adequate relief of symptoms at that time, then we may consider unilateral or bilateral  trochanteric bursal injections.

## 2023-06-05 NOTE — DISCUSSION/SUMMARY
[de-identified] : 66 y/o female with b/l hip trochanteric bursitis and mild right hip OA as well as left GUMARO in the setting of chronic pain and deconditioning. \par - We will begin conservative management at this time including PT, HEP, and I encouraged her to continue with her current multimodal pain management regimen. \par - F/u after PT with no new XR needed. If she has not experienced adequate relief of symptoms at that time, then we may consider unilateral or bilateral  trochanteric bursal injections.

## 2023-06-09 DIAGNOSIS — M43.16 SPONDYLOLISTHESIS, LUMBAR REGION: ICD-10-CM

## 2023-06-09 DIAGNOSIS — M54.50 LOW BACK PAIN, UNSPECIFIED: ICD-10-CM

## 2023-06-09 DIAGNOSIS — M25.551 PAIN IN RIGHT HIP: ICD-10-CM

## 2023-06-09 DIAGNOSIS — M25.552 PAIN IN LEFT HIP: ICD-10-CM

## 2023-06-09 DIAGNOSIS — M25.78 OSTEOPHYTE, VERTEBRAE: ICD-10-CM

## 2023-06-09 DIAGNOSIS — M25.851 OTHER SPECIFIED JOINT DISORDERS, RIGHT HIP: ICD-10-CM

## 2023-06-09 DIAGNOSIS — M25.751 OSTEOPHYTE, RIGHT HIP: ICD-10-CM

## 2023-06-09 DIAGNOSIS — M76.891 OTHER SPECIFIED ENTHESOPATHIES OF RIGHT LOWER LIMB, EXCLUDING FOOT: ICD-10-CM

## 2023-06-09 DIAGNOSIS — Z96.642 PRESENCE OF LEFT ARTIFICIAL HIP JOINT: ICD-10-CM

## 2023-06-23 ENCOUNTER — NON-APPOINTMENT (OUTPATIENT)
Age: 68
End: 2023-06-23

## 2023-06-23 ENCOUNTER — APPOINTMENT (OUTPATIENT)
Dept: INTERNAL MEDICINE | Facility: CLINIC | Age: 68
End: 2023-06-23
Payer: MEDICARE

## 2023-06-23 VITALS
DIASTOLIC BLOOD PRESSURE: 69 MMHG | TEMPERATURE: 98.7 F | BODY MASS INDEX: 32.36 KG/M2 | WEIGHT: 150 LBS | SYSTOLIC BLOOD PRESSURE: 112 MMHG | OXYGEN SATURATION: 97 % | HEART RATE: 65 BPM | HEIGHT: 57 IN

## 2023-06-23 DIAGNOSIS — Z01.818 ENCOUNTER FOR OTHER PREPROCEDURAL EXAMINATION: ICD-10-CM

## 2023-06-23 DIAGNOSIS — M81.0 AGE-RELATED OSTEOPOROSIS W/OUT CURRENT PATHOLOGICAL FRACTURE: ICD-10-CM

## 2023-06-23 DIAGNOSIS — F32.A DEPRESSION, UNSPECIFIED: ICD-10-CM

## 2023-06-23 PROCEDURE — 36415 COLL VENOUS BLD VENIPUNCTURE: CPT

## 2023-06-23 PROCEDURE — 93000 ELECTROCARDIOGRAM COMPLETE: CPT

## 2023-06-23 PROCEDURE — 99203 OFFICE O/P NEW LOW 30 MIN: CPT | Mod: 25

## 2023-06-23 RX ORDER — HYDROCHLOROTHIAZIDE 25 MG/1
25 TABLET ORAL
Refills: 0 | Status: ACTIVE | COMMUNITY

## 2023-06-23 RX ORDER — ALENDRONATE SODIUM 35 MG/1
35 TABLET ORAL
Refills: 0 | Status: ACTIVE | COMMUNITY

## 2023-06-23 RX ORDER — SERTRALINE HYDROCHLORIDE 100 MG/1
100 TABLET, FILM COATED ORAL
Refills: 0 | Status: ACTIVE | COMMUNITY

## 2023-06-26 LAB
ANION GAP SERPL CALC-SCNC: 12 MMOL/L
BUN SERPL-MCNC: 14 MG/DL
CALCIUM SERPL-MCNC: 9.8 MG/DL
CHLORIDE SERPL-SCNC: 98 MMOL/L
CO2 SERPL-SCNC: 25 MMOL/L
CREAT SERPL-MCNC: 0.68 MG/DL
EGFR: 95 ML/MIN/1.73M2
GLUCOSE SERPL-MCNC: 94 MG/DL
POTASSIUM SERPL-SCNC: 4.1 MMOL/L
SODIUM SERPL-SCNC: 135 MMOL/L

## 2023-06-26 NOTE — INTERPRETER SERVICES
[[employer] ] : provided by [employer]   [Time Spent: ____ minutes] : Total time spent using  services: [unfilled] minutes. The patient's primary language is not English thus required  services. [Interpreters_IDNumber] : [id number] [Interpreters_FullName] : Remy [TWNoteComboBox1] : Congolese

## 2023-06-26 NOTE — HISTORY OF PRESENT ILLNESS
[No Pertinent Cardiac History] : no history of aortic stenosis, atrial fibrillation, coronary artery disease, recent myocardial infarction, or implantable device/pacemaker [No Pertinent Pulmonary History] : no history of asthma, COPD, sleep apnea, or smoking [(Patient denies any chest pain, claudication, dyspnea on exertion, orthopnea, palpitations or syncope)] : Patient denies any chest pain, claudication, dyspnea on exertion, orthopnea, palpitations or syncope [Moderate (4-6 METs)] : Moderate (4-6 METs) [Chronic Anticoagulation] : no chronic anticoagulation [Chronic Kidney Disease] : no chronic kidney disease [Diabetes] : no diabetes [FreeTextEntry1] : Carpal Tunnel release, Trigger finger Release  [FreeTextEntry2] : 7/6/23 [FreeTextEntry3] : Dr. Cardenas [FreeTextEntry4] : Ms. ALLAN YOUNG is a 67 year old female with history of depression, insomnia, osteoporosis, cervical stenosis, and HTN  presenting today to the [facility] prior to prior to hand surgery.  [FreeTextEntry8] : Able to walk a few blocks. T[place] the subway to her visit today from the [place] and was able to up/down stairs and walk to the train.

## 2023-06-26 NOTE — ASSESSMENT
[High Risk Surgery - Intraperitoneal, Intrathoracic or Supringuinal Vascular Procedures] : High Risk Surgery - Intraperitoneal, Intrathoracic or Supringuinal Vascular Procedures - No (0) [Ischemic Heart Disease] : Ischemic Heart Disease - No (0) [Congestive Heart Failure] : Congestive Heart Failure - No (0) [Prior Cerebrovascular Accident or TIA] : Prior Cerebrovascular Accident or TIA - No (0) [Creatinine >= 2mg/dL (1 Point)] : Creatinine >= 2mg/dL - No (0) [Insulin-dependent Diabetic (1 Point)] : Insulin-dependent Diabetic - No (0) [0] : 0 , RCRI Class: I, Risk of Post-Op Cardiac Complications: 3.9%, 95% CI for Risk Estimate: 2.8% - 5.4% [Patient Optimized for Surgery] : Patient optimized for surgery [FreeTextEntry4] : Ms. ALLAN YOUNG is a Citizen of the [place] speaking 67 year old female with history of depression, insomnia, osteoporosis, cervical stenosis, and HTN presenting today to the [facility] prior to prior to hand surgery. ECG reviewed and unremarkable. She is medically optimized and considered low risk for low risk procedure pending labs.

## 2023-07-06 ENCOUNTER — APPOINTMENT (OUTPATIENT)
Dept: ORTHOPEDIC SURGERY | Facility: HOSPITAL | Age: 68
End: 2023-07-06

## 2023-07-06 ENCOUNTER — APPOINTMENT (OUTPATIENT)
Dept: ORTHOPEDIC SURGERY | Facility: CLINIC | Age: 68
End: 2023-07-06

## 2023-08-04 ENCOUNTER — APPOINTMENT (OUTPATIENT)
Dept: ORTHOPEDIC SURGERY | Facility: CLINIC | Age: 68
End: 2023-08-04

## 2023-09-21 ENCOUNTER — APPOINTMENT (OUTPATIENT)
Dept: ORTHOPEDIC SURGERY | Facility: CLINIC | Age: 68
End: 2023-09-21

## 2023-11-29 ENCOUNTER — APPOINTMENT (OUTPATIENT)
Dept: NEUROLOGY | Facility: CLINIC | Age: 68
End: 2023-11-29
Payer: MEDICARE

## 2023-11-29 VITALS
DIASTOLIC BLOOD PRESSURE: 90 MMHG | HEART RATE: 66 BPM | WEIGHT: 150 LBS | HEIGHT: 57 IN | BODY MASS INDEX: 32.36 KG/M2 | OXYGEN SATURATION: 97 % | SYSTOLIC BLOOD PRESSURE: 144 MMHG | TEMPERATURE: 97.3 F

## 2023-11-29 DIAGNOSIS — G47.00 INSOMNIA, UNSPECIFIED: ICD-10-CM

## 2023-11-29 PROCEDURE — 99214 OFFICE O/P EST MOD 30 MIN: CPT

## 2023-11-29 RX ORDER — ATENOLOL 25 MG/1
25 TABLET ORAL
Qty: 60 | Refills: 4 | Status: ACTIVE | COMMUNITY

## 2024-01-05 ENCOUNTER — APPOINTMENT (OUTPATIENT)
Dept: ORTHOPEDIC SURGERY | Facility: CLINIC | Age: 69
End: 2024-01-05

## 2024-01-16 ENCOUNTER — APPOINTMENT (OUTPATIENT)
Dept: ORTHOPEDIC SURGERY | Facility: CLINIC | Age: 69
End: 2024-01-16

## 2024-01-17 ENCOUNTER — APPOINTMENT (OUTPATIENT)
Dept: ORTHOPEDIC SURGERY | Facility: CLINIC | Age: 69
End: 2024-01-17

## 2024-02-26 ENCOUNTER — APPOINTMENT (OUTPATIENT)
Dept: ORTHOPEDIC SURGERY | Facility: CLINIC | Age: 69
End: 2024-02-26
Payer: MEDICARE

## 2024-02-26 DIAGNOSIS — M25.511 PAIN IN RIGHT SHOULDER: ICD-10-CM

## 2024-02-26 DIAGNOSIS — M54.9 DORSALGIA, UNSPECIFIED: ICD-10-CM

## 2024-02-26 DIAGNOSIS — G89.29 DORSALGIA, UNSPECIFIED: ICD-10-CM

## 2024-02-26 PROCEDURE — 73110 X-RAY EXAM OF WRIST: CPT | Mod: RT

## 2024-02-26 PROCEDURE — 73030 X-RAY EXAM OF SHOULDER: CPT | Mod: RT

## 2024-02-26 PROCEDURE — 99214 OFFICE O/P EST MOD 30 MIN: CPT

## 2024-02-26 PROCEDURE — 72110 X-RAY EXAM L-2 SPINE 4/>VWS: CPT

## 2024-02-26 RX ORDER — TIZANIDINE 2 MG/1
2 TABLET ORAL
Qty: 40 | Refills: 1 | Status: ACTIVE | COMMUNITY
Start: 2024-02-26 | End: 1900-01-01

## 2024-02-26 NOTE — HISTORY OF PRESENT ILLNESS
[de-identified] : 68 year old female followup for acute exacerbation of chronic right carpal tunnel syndrome and right long finger trigger finger. She also reports right shoulder pain worse with activity. She also has acute exacerbation of chronic low back pain radiating to her left leg. She denies recent illness, fevers, weakness, balance problems, saddle anesthesia, urinary retention or fecal incontinence. She has tried bracing for her carpal tunnel. She is interested in surgery.

## 2024-02-26 NOTE — PHYSICAL EXAM
[de-identified] : General: No acute distress, conversant, well-nourished. Head: Normocephalic, atraumatic Neck: trachea midline, FROM Heart: normotensive and normal rate and rhythm Lungs: No labored breathing Skin: No abrasions, no rashes, no edema Psych: Alert and oriented to person, place and time Extremities: no peripheral edema or digital cyanosis Vascular: warm and well perfused distally, palpable distal pulses  MSK: Right wrist +durkins  Right Shoulder Exam: No swelling, no erythema.   No tenderness to palpation.  + pain with active ROM  Negative Neer's impingement sign Negative Hawkin's test Positive Nasreen's test Good strength with shoulder ER and IR.  No tenderness at bicipital groove Negative Speed's test Negative Yergson's test  Negative Cross-body Adduction Negative Forsyth's test  Sensation intact to light touch axillary, medial and lateral antebrachial cutaneous, median, radial and ulnar distributions +motor deltoid, biceps, triceps, EPL, FDP and IO palpable radial pulse, WWP distally Lumbar spine: No tenderness to palpation.  No step-off, no deformity.  NEURO EXAM: Sensation  Left L2  -  2/2             Left L3  -  2/2 Left L4  -  2/2 Left L5  -  2/2 Left S1  -  2/2  Right L2  -  2/2             Right L3  -  2/2 Right L4  -  2/2 Right L5  -  2/2 Right S1  -  2/2  Motor:  Left L2 (hip flexion)                            5/5                 Left L3 (knee extension)                   5/5                 Left L4 (ankle dorsiflexion)                 5/5                 Left L5 (long toe extensor)                5/5                 Left S1 (ankle plantar flexion)           5/5  Right L2 (hip flexion)                            5/5                 Right L3 (knee extension)                   5/5                 Right L4 (ankle dorsiflexion)                 5/5                 Right L5 (long toe extensor)                5/5                 Right S1 (ankle plantar flexion)           5/5  Reflexes: Normal and symmetric Negative clonus.  Down-going Babinski.    [de-identified] : I ordered radiographs to evaluate the patient's symptoms. Right shoulder 2 view radiographs obtained in the office today shows no fracture or dislocation.  Age-related degenerative changes with glenohumeral OA. Right wrist 3 view radiographs obtained in the office today shows no fracture or dislocation. Lumbar 4 view radiographs taken in the office today show no dislocation or fracture.  Lumbar spondylosis.  No instability on dynamic series.

## 2024-02-26 NOTE — ASSESSMENT
[FreeTextEntry1] : 68 year old female followup for acute exacerbation of chronic right carpal tunnel syndrome and right long finger trigger finger. She also reports right shoulder pain worse with activity. She also has acute exacerbation of chronic low back pain radiating to her left leg. She denies recent illness, fevers, weakness, balance problems, saddle anesthesia, urinary retention or fecal incontinence. She has tried bracing for her carpal tunnel. She is interested in surgery. We discussed the risks and benefits of a right open carpal tunnel release and right long finger trigger finger release.  The risks include anesthesia, blood loss, need for blood transfusion, clots, stroke, myocardial infarct, chronic pain, loss of function, need for reoperation, infection, wound complications and damage to neurovascular structures.  She understands the risks. She asked several great questions all of which were answered. She will be scheduled for surgery.  She will be sent for a lumbar MRI. She was given a referral to see Dr. Causey for her shoulder. Tizanidine prn.   Followup after lumbar MRI. We discussed red flag symptoms that would require emergent evaluation. She knows to call with any questions or concerns or if her symptoms acutely worsen.

## 2024-03-01 ENCOUNTER — OUTPATIENT (OUTPATIENT)
Dept: OUTPATIENT SERVICES | Facility: HOSPITAL | Age: 69
LOS: 1 days | End: 2024-03-01
Payer: COMMERCIAL

## 2024-03-01 ENCOUNTER — APPOINTMENT (OUTPATIENT)
Dept: ORTHOPEDIC SURGERY | Facility: CLINIC | Age: 69
End: 2024-03-01
Payer: MEDICARE

## 2024-03-01 ENCOUNTER — RESULT REVIEW (OUTPATIENT)
Age: 69
End: 2024-03-01

## 2024-03-01 VITALS
SYSTOLIC BLOOD PRESSURE: 103 MMHG | DIASTOLIC BLOOD PRESSURE: 67 MMHG | HEIGHT: 57 IN | HEART RATE: 62 BPM | BODY MASS INDEX: 32.36 KG/M2 | WEIGHT: 150 LBS | OXYGEN SATURATION: 98 %

## 2024-03-01 DIAGNOSIS — Z98.891 HISTORY OF UTERINE SCAR FROM PREVIOUS SURGERY: Chronic | ICD-10-CM

## 2024-03-01 DIAGNOSIS — M16.11 UNILATERAL PRIMARY OSTEOARTHRITIS, RIGHT HIP: ICD-10-CM

## 2024-03-01 DIAGNOSIS — Z96.642 PRESENCE OF LEFT ARTIFICIAL HIP JOINT: ICD-10-CM

## 2024-03-01 PROCEDURE — 99213 OFFICE O/P EST LOW 20 MIN: CPT

## 2024-03-01 PROCEDURE — 73502 X-RAY EXAM HIP UNI 2-3 VIEWS: CPT

## 2024-03-01 PROCEDURE — 73502 X-RAY EXAM HIP UNI 2-3 VIEWS: CPT | Mod: 26,RT

## 2024-03-01 NOTE — HISTORY OF PRESENT ILLNESS
[de-identified] : Patient comes evaluation for pain.  Official  used for the visit she has pain that is from the top of her spine to the bottom spine.  She also has right shoulder pain.  Pain can radiate posteriorly to the buttock regions down the lateral thigh at times.  She wanted that the right hip evaluated.  It is worse with activity.  At times she feels the pain takes her breath away but she denies shortness of breath at this time.  No chest pain.  She is taking meloxicam and gabapentin.  She has had a left hip replacement and wanted to have her hips checked.

## 2024-03-01 NOTE — DISCUSSION/SUMMARY
[de-identified] : Back pain and shoulder pain as noted.  She has had a referral for her shoulder and encouraged her to keep that appointment.  I think she needs an evaluation of her spine.  She has been told she had stenosis before.  I provided contact information for that.  I do not think the right hip is causing significant discomfort although there is mild arthritis.  We discussed treatment she can use including the medications she has as well as ice and heat.  I recommend follow-up of the left hip every 2 years although it is doing well and she is pleased with her results.  We did ask her if she want to go to the emergency room for evaluation of any breathing issues but she does not have shortness of breath this time and these issues have been chronic and she is working with her physicians on this so she refused.  Patient is in agreement the care plan.  All questions answered.

## 2024-03-01 NOTE — PHYSICAL EXAM
[de-identified] : On physical exam she alert and oriented.  She is walking with a short stride and shuffling gait with a  stooped forward posture.  She has a well-healed anterior left hip incision.  There is no pain with gentle functional range of motion of either hip.  She is neurologically intact but does have pain with straight leg testing bilaterally. [de-identified] : 4 views of the right hip are reviewed.  There is mild arthritis with some narrowing and small marginal osteophytes but joint space is maintained.  The hip is in varus alignment.  There is a left total hip arthroplasty viewed on the AP pelvis with no evidence for hardware complication loosening fracture or dislocation.

## 2024-03-15 ENCOUNTER — APPOINTMENT (OUTPATIENT)
Dept: ORTHOPEDIC SURGERY | Facility: CLINIC | Age: 69
End: 2024-03-15
Payer: MEDICARE

## 2024-03-15 VITALS
HEART RATE: 69 BPM | DIASTOLIC BLOOD PRESSURE: 66 MMHG | BODY MASS INDEX: 32.36 KG/M2 | OXYGEN SATURATION: 100 % | SYSTOLIC BLOOD PRESSURE: 104 MMHG | HEIGHT: 57 IN | WEIGHT: 150 LBS | TEMPERATURE: 97.7 F

## 2024-03-15 DIAGNOSIS — M54.50 LOW BACK PAIN, UNSPECIFIED: ICD-10-CM

## 2024-03-15 DIAGNOSIS — M54.2 CERVICALGIA: ICD-10-CM

## 2024-03-15 DIAGNOSIS — M54.16 RADICULOPATHY, LUMBAR REGION: ICD-10-CM

## 2024-03-15 PROCEDURE — 72110 X-RAY EXAM L-2 SPINE 4/>VWS: CPT

## 2024-03-15 PROCEDURE — 99205 OFFICE O/P NEW HI 60 MIN: CPT

## 2024-03-15 NOTE — CONSULT LETTER
[Dear  ___] : Dear  [unfilled], [Please see my note below.] : Please see my note below. [Referral Letter:] : I am referring [unfilled] to you for further evaluation.  My most recent evaluation follows. [Referral Closing:] : Thank you very much for seeing this patient.  If you have any questions, please do not hesitate to contact me. [Sincerely,] : Sincerely, [FreeTextEntry2] : Darwin Motley MD  [address] [address] [place]   [FreeTextEntry3] : Sky Bruno MD

## 2024-03-15 NOTE — ASSESSMENT
[FreeTextEntry1] : 68-year-old female with chronic neck and low back pain without response to physical therapy

## 2024-03-15 NOTE — DISCUSSION/SUMMARY
[de-identified] : With help of a  I reviewed my impression of her symptoms and the available imaging findings today.  She states that she does have a scheduled MRI for the cervical spine on 3/21/2024.  I also requested MRI of the lumbar spine to be completed for additional diagnostic information.  Once his images are available for review we will discuss appropriate next steps as needed.  With regards to her difficulty with inspiration and pain I have also reinforced Dr. Motley's recommendation for referral to a pulmonologist which is already placed in our system as well as presentation to the ER should this worsen or change in character.  All questions were answered and we will follow-up after imaging is complete  I have spent greater than 60 minutes preparing to see the patient, collecting relevant history, performing a thorough history and physical examination, counseling the patient regarding my findings ordering the appropriate therapies and tests, communicating with other relevant healthcare professionals, documenting my encounter and coordinating care.

## 2024-03-15 NOTE — HISTORY OF PRESENT ILLNESS
[de-identified] : 68-year-old female presents as a new patient for evaluation of neck and low back pain.  She states this has been present for many years however worse over the last several months.  The back pain is located in the right side near the lumbosacral junction is worse when she tries to stand upright or change position.  The neck pain was diffuse throughout the trapezial region into the mid back.  She also describes some pain in both areas when trying to take a full deep breath.  She has previously been seen by Dr. Motley who recommended pulmonologist referral and trip to the ER should there be any difficulty breathing or worsening pain with inspiration.  She has completed some physical therapy for the neck and low back pain in the past however this has not helped her.  Occasionally has some radiating pain into the bilateral arms and legs diffusely without radicular pattern

## 2024-03-15 NOTE — PHYSICAL EXAM
[Antalgic] : antalgic [UE/LE] : Sensory: Intact in bilateral upper & lower extremities [Knee] : patellar 2+ and symmetric bilaterally [Ankle] : ankle 2+ and symmetric bilaterally [Normal Proprioception] : sensation intact for proprioception [Normal Touch] : sensation intact for touch [de-identified] : MRI lumbar spine 11/15/2022 Mild to moderate stenosis at L4-5 and L5-S1 with maintenance of disc space height  4 view lumbar spine x-ray 3/15/2024 Ortho PACS: Some anterior osteophyte nation at the upper lumbar levels.  Disc base heights are maintained.  No coronal or sagittal abnormality.  Minimal spondylotic change [Normal] : No costovertebral angle tenderness and no spinal tenderness

## 2024-03-21 ENCOUNTER — APPOINTMENT (OUTPATIENT)
Dept: MRI IMAGING | Facility: HOSPITAL | Age: 69
End: 2024-03-21

## 2024-03-21 ENCOUNTER — OUTPATIENT (OUTPATIENT)
Dept: OUTPATIENT SERVICES | Facility: HOSPITAL | Age: 69
LOS: 1 days | End: 2024-03-21
Payer: COMMERCIAL

## 2024-03-21 DIAGNOSIS — Z98.891 HISTORY OF UTERINE SCAR FROM PREVIOUS SURGERY: Chronic | ICD-10-CM

## 2024-03-21 PROCEDURE — 72148 MRI LUMBAR SPINE W/O DYE: CPT | Mod: 26

## 2024-03-21 PROCEDURE — 72148 MRI LUMBAR SPINE W/O DYE: CPT

## 2024-03-22 ENCOUNTER — APPOINTMENT (OUTPATIENT)
Dept: ORTHOPEDIC SURGERY | Facility: HOSPITAL | Age: 69
End: 2024-03-22

## 2024-03-28 ENCOUNTER — APPOINTMENT (OUTPATIENT)
Dept: ORTHOPEDIC SURGERY | Facility: CLINIC | Age: 69
End: 2024-03-28

## 2024-03-29 ENCOUNTER — APPOINTMENT (OUTPATIENT)
Dept: ORTHOPEDIC SURGERY | Facility: CLINIC | Age: 69
End: 2024-03-29
Payer: MEDICARE

## 2024-03-29 VITALS
DIASTOLIC BLOOD PRESSURE: 81 MMHG | BODY MASS INDEX: 32.36 KG/M2 | SYSTOLIC BLOOD PRESSURE: 126 MMHG | OXYGEN SATURATION: 97 % | HEIGHT: 57 IN | HEART RATE: 71 BPM | WEIGHT: 150 LBS

## 2024-03-29 DIAGNOSIS — M54.9 DORSALGIA, UNSPECIFIED: ICD-10-CM

## 2024-03-29 DIAGNOSIS — G89.29 DORSALGIA, UNSPECIFIED: ICD-10-CM

## 2024-03-29 PROCEDURE — 99215 OFFICE O/P EST HI 40 MIN: CPT

## 2024-03-29 RX ORDER — DICLOFENAC SODIUM 75 MG/1
75 TABLET, DELAYED RELEASE ORAL
Qty: 40 | Refills: 3 | Status: ACTIVE | COMMUNITY
Start: 2024-03-29 | End: 1900-01-01

## 2024-03-30 NOTE — PHYSICAL EXAM
[Antalgic] : antalgic [UE/LE] : Sensory: Intact in bilateral upper & lower extremities [Ankle] : ankle 2+ and symmetric bilaterally [Knee] : patellar 2+ and symmetric bilaterally [Normal Touch] : sensation intact for touch [Normal Proprioception] : sensation intact for proprioception [Normal] : No costovertebral angle tenderness and no spinal tenderness [de-identified] : MRI lumbar spine care extreme 3/21/2024: Disc base heights are maintained at all visualized levels.  L5-S1 mild central canal stenosis.  L4-5 mild to moderate central canal stenosis with some facet hypertrophy.  At L3-4 above there is minimal degenerative change. [de-identified] : Provocative maneuvers right rotator cuff positive.  Minimal biceps tenderness

## 2024-03-30 NOTE — DISCUSSION/SUMMARY
[de-identified] : I reviewed the MRI with the patient do not notice any obvious structural cause for her symptoms.  We noted the minimal stenosis as well as some very mild facet arthritis for which interventional approaches and injection may be warranted however for now I recommend ongoing physical therapy as she has not yet completed much of this.  With regards to the right shoulder pain Roby made referral to my colleague sports medicine for possible right trapezial trigger point or intra-articular shoulder injection.  All questions were answered and she will follow-up with me on as-needed basis   I have spent greater than 45 minutes preparing to see the patient, collecting relevant history, performing a thorough history and physical examination, counseling the patient regarding my findings ordering the appropriate therapies and tests, communicating with other relevant healthcare professionals, documenting my encounter and coordinating care.

## 2024-03-30 NOTE — ASSESSMENT
[FreeTextEntry1] : 68-year-old female with chronic right low back and right-sided trapezial and shoulder pain

## 2024-03-30 NOTE — HISTORY OF PRESENT ILLNESS
[de-identified] : Returns today to review the results of the recently completed lumbar spine MRI.  Denies any considerable change in her symptoms to last visit.  Still describes pain over the right shoulder trapezial area as well as the right low back with occasional radiation to the posterior lower extremity

## 2024-04-02 ENCOUNTER — APPOINTMENT (OUTPATIENT)
Dept: ORTHOPEDIC SURGERY | Facility: CLINIC | Age: 69
End: 2024-04-02

## 2024-04-04 ENCOUNTER — APPOINTMENT (OUTPATIENT)
Dept: ORTHOPEDIC SURGERY | Facility: CLINIC | Age: 69
End: 2024-04-04
Payer: MEDICARE

## 2024-04-04 ENCOUNTER — OUTPATIENT (OUTPATIENT)
Dept: OUTPATIENT SERVICES | Facility: HOSPITAL | Age: 69
LOS: 1 days | End: 2024-04-04
Payer: COMMERCIAL

## 2024-04-04 ENCOUNTER — RESULT REVIEW (OUTPATIENT)
Age: 69
End: 2024-04-04

## 2024-04-04 VITALS
HEART RATE: 68 BPM | HEIGHT: 57 IN | WEIGHT: 150 LBS | OXYGEN SATURATION: 96 % | DIASTOLIC BLOOD PRESSURE: 68 MMHG | SYSTOLIC BLOOD PRESSURE: 107 MMHG | BODY MASS INDEX: 32.36 KG/M2

## 2024-04-04 DIAGNOSIS — Z98.891 HISTORY OF UTERINE SCAR FROM PREVIOUS SURGERY: Chronic | ICD-10-CM

## 2024-04-04 PROCEDURE — 73030 X-RAY EXAM OF SHOULDER: CPT | Mod: 26,50

## 2024-04-04 PROCEDURE — 99214 OFFICE O/P EST MOD 30 MIN: CPT

## 2024-04-04 PROCEDURE — 73030 X-RAY EXAM OF SHOULDER: CPT | Mod: 50

## 2024-04-04 PROCEDURE — 73030 X-RAY EXAM OF SHOULDER: CPT

## 2024-04-04 RX ORDER — DICLOFENAC SODIUM 1% 10 MG/G
1 GEL TOPICAL
Qty: 100 | Refills: 0 | Status: ACTIVE | COMMUNITY
Start: 2024-04-04 | End: 1900-01-01

## 2024-04-05 NOTE — PHYSICAL EXAM
[de-identified] : General: Well-nourished, well-developed, alert, and in no acute distress. Head: Normocephalic. Eyes: Pupils equal, extraocular muscles intact, normal sclera. Nose: No nasal discharge. Cardiovascular: Extremities are warm and well perfused. Distal pulses are symmetric bilaterally. Respiratory: No labored breathing. Extremities: Sensation is intact distally bilaterally. Distal pulses are symmetric bilaterally Lymphatic: No regional lymphadenopathy, no lymphedema Neurologic: No focal deficits Skin: Normal skin color, texture, and turgor Psychiatric: Normal affect  MSK:   C-spine demonstrates normal lordosis, no tenderness to palpation midline, paraspinals, full painless AROM Cervical facet loading negative Spurling's Compression negative   Examination of right shoulder shows no overlying skin changes or muscular atrophy.  There is tenderness to palpation over the AC joint, Bicipital groove, Trapezius Range of motion shows forward elevation of [90, abduction [70], external rotation [40], and internal rotation to the gluteal area.  There is pain with forward flexion extension.   Special Tests: Painful Arc [positive] Neer's [positive] Hawkin's [positive] Nasreen's positive Resisted ext rotation negative Lift-Off [positive] McKenzie negative Speed's negative   Examination of left shoulder shows no overlying skin changes or muscular atrophy.  There is no tenderness to palpation over the AC joint, Bicipital groove, Trapezius Range of motion shows forward elevation of [90], abduction [80], external rotation [50], and internal rotation to the [lumbar spine].  There is no pain at the end range of motion.   Special Tests: Painful Arc negative Neer's negative Hawkin's negative Nasreen's negative Resisted ext rotation negative Lift-Off negative McKenzie negative Speed's negative   Sensation is intact to light touch over the axillary, musculocutaneous, median, radial, and ulnar nerve distributions bilaterally. Capillary refill is less than two seconds. Radial pulses 2+ equal bilaterally. Strength testing shows 5/5 abduction, 5/5 external rotation, 5/5 internal rotation, 5/5 biceps, 5/5 triceps. 5/5 wrist extension, 5/5 intrinsics. Reflexes 2+ biceps, brachioradialis. Beltran negative. [de-identified] : X-rays of bilateral shoulders taken today, 4/4/2024, show no evidence of fracture or dislocation but reveal bilateral glenohumeral joint space narrowing, subchondrosclerosis, and osteophytosis.

## 2024-04-05 NOTE — ASSESSMENT
[FreeTextEntry1] : ALLAN YOUNG is a 68 year old female with right shoulder pain. I discussed with the patient that their symptoms, signs, and imaging are most consistent with osteoarthritis.  We reviewed the natural history of this condition and treatment options ranging from conservative measures (activity modification, physical therapy, icing, oral anti-inflammatory and/or analgesic medications, steroid injection) to surgical management.  We agreed on the following plan:  X-rays taken and reviewed with patient today Activity modification Start Home Exercises for shoulder stretching and strengthening (Neer protocol). Demonstration, resistance band and handout provided.  Physical therapy. Referral provided. Medication: Continue with PO Diclofenac 75mg daily as needed, Acetaminophen 1g QID PRN. Diclofenac gel to be taken PRN, prescription provided. Discussed  US guided CSI. Deferred per patient preference.  Follow up in 6 weeks.

## 2024-04-05 NOTE — END OF VISIT
[FreeTextEntry3] :  All medical record entries made by the Scribe were at my, Dr.Leslie Munguia's, direction and personally dictated by me on ~[email or web address]~. I have reviewed the chart and agree that the record accurately reflects my personal performance of the history, physical exam, assessment and plan. I have also personally directed, reviewed, and agreed with the chart. [Time Spent: ___ minutes] : I have spent [unfilled] minutes of time on the encounter.

## 2024-04-05 NOTE — DISCUSSION/SUMMARY

## 2024-04-05 NOTE — HISTORY OF PRESENT ILLNESS
[de-identified] : ALLAN YOUNG is a 68 year old female who presents with right shoulder pain, referred by Dr. Bruno.  Patient is right-hand dominant. States the onset of pain was three months ago. No antecedent trauma or injury. Has not experienced previously. Pain is mostly generalized at the shoulder. She states that her hand goes numb. Reaching above the head and reaching behind her back makes the pain worse.  No associated neck pain. Exacerbating factors are lifting, dressing, lifting arms over head, grasping Has tried taking Diclofenac 75mg and Gabapentin 300mg. Exercises regularly. Has not tried PT or OT. No prior injections or surgery.   #: [phone]

## 2024-04-18 ENCOUNTER — APPOINTMENT (OUTPATIENT)
Dept: ORTHOPEDIC SURGERY | Facility: HOSPITAL | Age: 69
End: 2024-04-18

## 2024-04-29 ENCOUNTER — APPOINTMENT (OUTPATIENT)
Dept: PULMONOLOGY | Facility: CLINIC | Age: 69
End: 2024-04-29

## 2024-04-29 NOTE — HISTORY OF PRESENT ILLNESS
[FreeTextEntry1] : The patient is a 68 year old F with PMHx of depression, insomnia, chronic lower back pain, HTN, referred by Dr. Castrejon for insomnia. Goes to bed at 12:30AM, awakens at 2AM and 9AM. Takes Doxepine, Sertraline, Gabapentin. Has chronic LBP for which she is following with Rheum and neuro.

## 2024-05-14 ENCOUNTER — APPOINTMENT (OUTPATIENT)
Dept: NEUROLOGY | Facility: CLINIC | Age: 69
End: 2024-05-14
Payer: MEDICARE

## 2024-05-14 VITALS
OXYGEN SATURATION: 99 % | HEART RATE: 61 BPM | HEIGHT: 57 IN | SYSTOLIC BLOOD PRESSURE: 130 MMHG | WEIGHT: 160 LBS | DIASTOLIC BLOOD PRESSURE: 79 MMHG | BODY MASS INDEX: 34.52 KG/M2 | TEMPERATURE: 98 F

## 2024-05-14 PROCEDURE — 99214 OFFICE O/P EST MOD 30 MIN: CPT

## 2024-05-14 PROCEDURE — G2211 COMPLEX E/M VISIT ADD ON: CPT

## 2024-05-14 RX ORDER — LISINOPRIL 30 MG/1
TABLET ORAL DAILY
Refills: 0 | Status: DISCONTINUED | COMMUNITY
End: 2024-05-14

## 2024-05-14 RX ORDER — GABAPENTIN 300 MG/1
300 CAPSULE ORAL
Qty: 60 | Refills: 5 | Status: ACTIVE | COMMUNITY
Start: 2022-06-21 | End: 1900-01-01

## 2024-05-15 NOTE — ASU PATIENT PROFILE, ADULT - NS PREOP UNDERSTANDS INFO
NPO solids after 7 am on DOS 5/16/24, stop clears after 1:45 pm on DOS, bring insurance card and photo ID, Escort to bring photo ID, address and phone # reviewed with pt. 's grand daughter/yes

## 2024-05-16 ENCOUNTER — OUTPATIENT (OUTPATIENT)
Dept: OUTPATIENT SERVICES | Facility: HOSPITAL | Age: 69
LOS: 1 days | Discharge: HOME | End: 2024-05-16
Payer: MEDICARE

## 2024-05-16 ENCOUNTER — TRANSCRIPTION ENCOUNTER (OUTPATIENT)
Age: 69
End: 2024-05-16

## 2024-05-16 ENCOUNTER — APPOINTMENT (OUTPATIENT)
Dept: ORTHOPEDIC SURGERY | Facility: HOSPITAL | Age: 69
End: 2024-05-16

## 2024-05-16 VITALS
DIASTOLIC BLOOD PRESSURE: 60 MMHG | HEART RATE: 74 BPM | SYSTOLIC BLOOD PRESSURE: 125 MMHG | OXYGEN SATURATION: 96 % | TEMPERATURE: 97 F | RESPIRATION RATE: 16 BRPM

## 2024-05-16 VITALS
SYSTOLIC BLOOD PRESSURE: 134 MMHG | OXYGEN SATURATION: 98 % | DIASTOLIC BLOOD PRESSURE: 61 MMHG | RESPIRATION RATE: 16 BRPM | HEART RATE: 67 BPM | WEIGHT: 160.94 LBS | TEMPERATURE: 98 F | HEIGHT: 57 IN

## 2024-05-16 DIAGNOSIS — Z96.642 PRESENCE OF LEFT ARTIFICIAL HIP JOINT: Chronic | ICD-10-CM

## 2024-05-16 DIAGNOSIS — Z98.891 HISTORY OF UTERINE SCAR FROM PREVIOUS SURGERY: Chronic | ICD-10-CM

## 2024-05-16 PROCEDURE — 26055 INCISE FINGER TENDON SHEATH: CPT | Mod: F7

## 2024-05-16 PROCEDURE — 64721 CARPAL TUNNEL SURGERY: CPT | Mod: RT

## 2024-05-16 RX ORDER — HYDROCHLOROTHIAZIDE 25 MG
1 TABLET ORAL
Qty: 0 | Refills: 0 | DISCHARGE

## 2024-05-16 RX ORDER — FENTANYL CITRATE 50 UG/ML
25 INJECTION INTRAVENOUS
Refills: 0 | Status: DISCONTINUED | OUTPATIENT
Start: 2024-05-16 | End: 2024-05-16

## 2024-05-16 RX ORDER — SODIUM CHLORIDE 9 MG/ML
1000 INJECTION, SOLUTION INTRAVENOUS
Refills: 0 | Status: DISCONTINUED | OUTPATIENT
Start: 2024-05-16 | End: 2024-05-16

## 2024-05-16 RX ORDER — OMEPRAZOLE 10 MG/1
1 CAPSULE, DELAYED RELEASE ORAL
Refills: 0 | DISCHARGE

## 2024-05-16 RX ORDER — MELOXICAM 15 MG/1
1 TABLET ORAL
Refills: 0 | DISCHARGE

## 2024-05-16 RX ORDER — ATENOLOL 25 MG/1
1 TABLET ORAL
Qty: 0 | Refills: 0 | DISCHARGE

## 2024-05-16 RX ORDER — OXYCODONE HYDROCHLORIDE 5 MG/1
1 TABLET ORAL
Qty: 20 | Refills: 0
Start: 2024-05-16 | End: 2024-05-20

## 2024-05-16 RX ORDER — ACETAMINOPHEN 500 MG
2 TABLET ORAL
Refills: 0 | DISCHARGE

## 2024-05-16 RX ORDER — GABAPENTIN 400 MG/1
1 CAPSULE ORAL
Refills: 0 | DISCHARGE

## 2024-05-16 RX ORDER — CHLORHEXIDINE GLUCONATE 213 G/1000ML
1 SOLUTION TOPICAL ONCE
Refills: 0 | Status: COMPLETED | OUTPATIENT
Start: 2024-05-16 | End: 2024-05-16

## 2024-05-16 RX ORDER — SERTRALINE 25 MG/1
1 TABLET, FILM COATED ORAL
Refills: 0 | DISCHARGE

## 2024-05-16 RX ORDER — OXYCODONE 5 MG/1
5 TABLET ORAL EVERY 6 HOURS
Qty: 28 | Refills: 0 | Status: ACTIVE | COMMUNITY
Start: 2024-05-16 | End: 1900-01-01

## 2024-05-16 RX ADMIN — CHLORHEXIDINE GLUCONATE 1 APPLICATION(S): 213 SOLUTION TOPICAL at 15:13

## 2024-05-16 NOTE — BRIEF OPERATIVE NOTE - NSICDXBRIEFPOSTOP_GEN_ALL_CORE_FT
POST-OP DIAGNOSIS:  Right trigger finger 16-May-2024 16:43:15  Jia Cannon  Right carpal tunnel syndrome 16-May-2024 16:43:09  Jia Cannon

## 2024-05-16 NOTE — BRIEF OPERATIVE NOTE - NSICDXBRIEFPROCEDURE_GEN_ALL_CORE_FT
PROCEDURES:  Carpal tunnel release 16-May-2024 16:42:50  Jia Cannon  Trigger finger release 16-May-2024 16:43:01 right long finger Jia Cannon

## 2024-05-16 NOTE — PRE-ANESTHESIA EVALUATION ADULT - NSANTHADDINFOFT_GEN_ALL_CORE
MAC /possible GA  Interview and consent obtained with granddaughter interpreting at patient's request

## 2024-05-16 NOTE — BRIEF OPERATIVE NOTE - NSICDXBRIEFPREOP_GEN_ALL_CORE_FT
PRE-OP DIAGNOSIS:  Right carpal tunnel syndrome 16-May-2024 16:41:58  Jia Cannon  Right trigger finger 16-May-2024 16:41:45  Jia Cannon

## 2024-05-16 NOTE — ASU DISCHARGE PLAN (ADULT/PEDIATRIC) - ASU DC SPECIAL INSTRUCTIONSFT
ACTIVITY:  - No strenuous activity, heavy lifting, driving or returning to work until cleared by MD.    DRESSING:   - You may shower, but avoid getting dressing wet. If your dressing gets wet before then, pat dressing dry and reapply dressing.  - Keep your incision clean and dry. Do not pick at your incision. Do not apply creams, ointments or oils to your incision until cleared by your surgeon. Do not soak your incision in sitting water (ie tubs, pools, lakes, etc.) until cleared by your surgeon.     (STAPLES/SUTURES/STERI-STRIPS)     -Once dressing removed at next follow up appointment, keep incision clean and dry. Do not pick at your incision. Do not apply creams, ointments or oils to your incision until cleared by your surgeon. Do not soak your incision in sitting water (ie tubs, pools, lakes, etc.) until cleared by your surgeon.    MEDICATION/ANTICOAGULATION:  - You have been prescribed medications for pain:    - Tylenol (Acetaminophen) for mild to moderate pain. Do not exceed 3,000mg daily.    - For more severe pain, you may continue to take the Tylenol with the addition of narcotic pain medication (oxycodone). Take this medication as prescribed. Do not take more than prescribed. Note that this medication may cause drowsiness or dizziness. Do not operate machinery. This medication may cause constipation.  - Try to have regular bowel movements. Take stool softener or laxative if necessary. You may wish to take Miralax daily until you have regular bowel movements.   - Do not take antiinflammatories (Aleve, Advil, Naproxen, Ibuprofen, etc.) until cleared by your surgeon. Tylenol is not an anti-inflammatory and okay to take (see above).  - If you have a pain management physician, please follow-up with them postoperatively.   - If you experience any negative side effects of your medications, please call your surgeon's office to discuss.    Follow-up:  - Call to schedule an appt with Dr. Cardenas for follow up. If you have staples or sutures they will be removed in office.  - Please follow-up with your primary care physician or any other specialist you see postoperatively, if needed.     - Contact your doctor if you experience: fever greater than 101.5, chills, chest pain, difficulty breathing, redness or excessive drainage around the incision, other concerns.

## 2024-05-16 NOTE — ASU PREOP CHECKLIST - HAIR REMOVAL
Pt with h/o afib on propafenone and pradaxa. Currently RRR  - c/w propafenone, but consider changing rate controlling medication ( see above)   - holding pradaxa at this time given h/o falls; will need to have discussion regarding risks vs. benefits of continuing AC   - per daughter, cardiologist decreased pradaxa dose recently but wanted to continue it hair removal not indicated Pt with h/o afib on propafenone and Pradaxa. Currently RRR  - c/w propafenone, but consider changing rate controlling medication (see above)   - holding pradaxa at this time given h/o falls; will need to have discussion regarding risks vs. benefits of continuing AC   - per daughter, cardiologist decreased pradaxa dose recently but wanted to continue it

## 2024-05-16 NOTE — ASU DISCHARGE PLAN (ADULT/PEDIATRIC) - CARE PROVIDER_API CALL
Negrito Cardenas Truong  Orthopaedic Surgery  [address] [place] [address], [address]  [place] [address]  Phone: [phone]  Fax: [phone]  Follow Up Time:

## 2024-05-22 ENCOUNTER — APPOINTMENT (OUTPATIENT)
Dept: ORTHOPEDIC SURGERY | Facility: CLINIC | Age: 69
End: 2024-05-22
Payer: MEDICARE

## 2024-05-22 DIAGNOSIS — M65.331 TRIGGER FINGER, RIGHT MIDDLE FINGER: ICD-10-CM

## 2024-05-22 PROCEDURE — 99024 POSTOP FOLLOW-UP VISIT: CPT

## 2024-05-22 RX ORDER — DICLOFENAC SODIUM 75 MG/1
75 TABLET, DELAYED RELEASE ORAL
Qty: 60 | Refills: 1 | Status: ACTIVE | COMMUNITY
Start: 2024-05-22 | End: 1900-01-01

## 2024-05-22 NOTE — HISTORY OF PRESENT ILLNESS
[___ Weeks Post Op] : [unfilled] weeks post op [5] : the patient reports pain that is 5/10 in severity [Chills] : no chills [Constipation] : no constipation [Diarrhea] : no diarrhea [Dysuria] : no dysuria [Fever] : no fever [Nausea] : no nausea [Vomiting] : no vomiting [de-identified] : post op f/u s/p right carpal tunnel release and trigger finger release of right long finger (5/16/24) [de-identified] : 69 y/o female presenting for f/u s/p right carpal tunnel release and trigger finger release of right long finger (5/16/24). She is doing well. She feels pain and swelling in her right long finger. Numbness has resolved. Diclofenac helps.  denies radicular pain, recent illness, fevers, numbness, weakness, balance problems, saddle anesthesia, urinary retention or fecal incontinence. Current symptoms include no chills, no constipation, no diarrhea, no dysuria, no fever, no nausea and no vomiting. [de-identified] : Right hand:  Bandage clean and dry Splint removed Mild swelling in right long finger Good ROM sensation intact to light touch  pulses intact and equal bilaterally [de-identified] : 69 y/o female presenting for f/u s/p right carpal tunnel release and trigger finger release of right long finger (5/16/24). She is doing well. She feels pain and swelling in her right long finger. Numbness has resolved. Diclofenac helps.  denies radicular pain, recent illness, fevers, numbness, weakness, balance problems, saddle anesthesia, urinary retention or fecal incontinence. Current symptoms include no chills, no constipation, no diarrhea, no dysuria, no fever, no nausea and no vomiting. Patient is progressing well in post operative course.  [de-identified] : Splint removed and wrist brace given. Continue diclofenac. Recommended elevating right arm. Encouraged to continue moving fingers. Follow up in 1 week for suture removal. We discussed red flag symptoms that would require emergent evaluation. She knows to call with any questions or concerns or if her symptoms acutely worsen.

## 2024-05-23 ENCOUNTER — APPOINTMENT (OUTPATIENT)
Dept: ORTHOPEDIC SURGERY | Facility: CLINIC | Age: 69
End: 2024-05-23
Payer: MEDICARE

## 2024-05-23 VITALS
SYSTOLIC BLOOD PRESSURE: 127 MMHG | BODY MASS INDEX: 34.52 KG/M2 | WEIGHT: 160 LBS | HEART RATE: 75 BPM | HEIGHT: 57 IN | OXYGEN SATURATION: 97 % | DIASTOLIC BLOOD PRESSURE: 79 MMHG

## 2024-05-23 DIAGNOSIS — M19.011 PRIMARY OSTEOARTHRITIS, RIGHT SHOULDER: ICD-10-CM

## 2024-05-23 DIAGNOSIS — M19.012 PRIMARY OSTEOARTHRITIS, RIGHT SHOULDER: ICD-10-CM

## 2024-05-23 PROCEDURE — 99213 OFFICE O/P EST LOW 20 MIN: CPT

## 2024-05-27 PROBLEM — M19.011 PRIMARY OSTEOARTHRITIS OF BOTH SHOULDERS: Status: ACTIVE | Noted: 2024-04-04

## 2024-05-27 NOTE — HISTORY OF PRESENT ILLNESS
[de-identified] : ALLAN YOUNG is a 68 year old female presents to follow up Last visit was 04/04/24 Patient is following up for R shoulder pain. In the interval, patient had R wrist surgery 1 week ago. Patient reports no improvement of shoulder pain, stating that it is unchanged from last visit. Pain is present with pressure on the shoulder (such as laying on it to sleep) and with overhead movements. Patient states that she is unable to do any ADLs involving overhead movement of the R arm. Had been doing PT until wrist surgery. Has taken diclofenac, tylenol, and a topical analgesic which have provided mild relief.    #: [id number]

## 2024-05-27 NOTE — PHYSICAL EXAM
[Pain Left Shoulder Active Forw Flexion Against Resistance] : positive Empty Can test [Shoulder Pain Elicited During Sullivan's Test Left] : positive Larson's test [Active Abduction Left Shoulder Decreased] : positive Painful Arc [Shoulder Motion On Adduction] : negative AC provocation [Shoulder Pain During Ho-Timoteo Impingement Test Left] : negative Ho test [FreeTextEntry2] : Flexion 100 Abduction 140 External rotation 30 Internal rotation unable to perform

## 2024-05-31 ENCOUNTER — APPOINTMENT (OUTPATIENT)
Dept: ORTHOPEDIC SURGERY | Facility: CLINIC | Age: 69
End: 2024-05-31
Payer: MEDICARE

## 2024-05-31 DIAGNOSIS — G56.01 CARPAL TUNNEL SYNDROME, RIGHT UPPER LIMB: ICD-10-CM

## 2024-05-31 PROCEDURE — 99024 POSTOP FOLLOW-UP VISIT: CPT

## 2024-05-31 RX ORDER — IBUPROFEN 600 MG/1
600 TABLET, FILM COATED ORAL
Qty: 90 | Refills: 0 | Status: ACTIVE | COMMUNITY
Start: 2024-05-31 | End: 1900-01-01

## 2024-05-31 NOTE — HISTORY OF PRESENT ILLNESS
[de-identified] : post op f/u s/p right carpal tunnel release and trigger finger release of right long finger (5/16/24).  [de-identified] : 69 y/o female presenting for f/u s/p right carpal tunnel release and trigger finger release of right long finger (5/16/24). A Indonesian language line  was used for the entire encounter.   [de-identified] : Right hand: Incision with small dehiscence. No drainage or erythema.   neurovascularly intact pulses intact and equal bilaterally. [de-identified] : 69 y/o female presenting for f/u s/p right carpal tunnel release and trigger finger release of right long finger (5/16/24).  [de-identified] : Incision with small dehiscence. Recommend wound care. F/U 1 week for wound check. We discussed red flag symptoms that would require emergent evaluation. She knows to call with any questions or concerns or if her symptoms acutely worsen.

## 2024-06-14 ENCOUNTER — APPOINTMENT (OUTPATIENT)
Dept: ORTHOPEDIC SURGERY | Facility: CLINIC | Age: 69
End: 2024-06-14
Payer: MEDICARE

## 2024-06-14 DIAGNOSIS — M79.641 PAIN IN RIGHT HAND: ICD-10-CM

## 2024-06-14 DIAGNOSIS — M79.642 PAIN IN RIGHT HAND: ICD-10-CM

## 2024-06-14 PROCEDURE — 99024 POSTOP FOLLOW-UP VISIT: CPT

## 2024-06-14 NOTE — HISTORY OF PRESENT ILLNESS
[___ Weeks Post Op] : [unfilled] weeks post op [5] : the patient reports pain that is 5/10 in severity [Chills] : no chills [Constipation] : no constipation [Diarrhea] : no diarrhea [Dysuria] : no dysuria [Fever] : no fever [Nausea] : no nausea [Vomiting] : no vomiting [de-identified] : post op f/u s/p right carpal tunnel release and trigger finger release of right long finger (5/16/24). [de-identified] : 69 y/o female s/p right carpal tunnel release and trigger finger release of right long finger (5/16/24). Her pain is improving overall. She has been wearing brace and taking diclofenac. no chills, no constipation, no diarrhea, no dysuria, no fever, no nausea and no vomiting. [de-identified] : Right hand: Incision healing well. Steri strips removed. No drainage or erythema. neurovascularly intact pulses intact and equal bilaterally.   [de-identified] : 67 y/o female s/p right carpal tunnel release and trigger finger release of right long finger (5/16/24). Her pain is improving overall. She has been wearing brace and taking diclofenac. no chills, no constipation, no diarrhea, no dysuria, no fever, no nausea and no vomiting. Doing well in post operative course.  [de-identified] : Ok to d/c brace. Diclofenac PRN. in 6-8 weeks.

## 2024-07-26 ENCOUNTER — APPOINTMENT (OUTPATIENT)
Dept: ORTHOPEDIC SURGERY | Facility: CLINIC | Age: 69
End: 2024-07-26
Payer: MEDICARE

## 2024-07-26 DIAGNOSIS — G56.01 CARPAL TUNNEL SYNDROME, RIGHT UPPER LIMB: ICD-10-CM

## 2024-07-26 PROCEDURE — 99024 POSTOP FOLLOW-UP VISIT: CPT

## 2024-07-26 RX ORDER — CYCLOBENZAPRINE HYDROCHLORIDE 10 MG/1
10 TABLET, FILM COATED ORAL 3 TIMES DAILY
Qty: 42 | Refills: 1 | Status: ACTIVE | COMMUNITY
Start: 2024-07-26 | End: 1900-01-01

## 2024-07-26 RX ORDER — DICLOFENAC SODIUM 75 MG/1
75 TABLET, DELAYED RELEASE ORAL
Qty: 60 | Refills: 1 | Status: ACTIVE | COMMUNITY
Start: 2024-07-26 | End: 1900-01-01

## 2024-07-26 NOTE — END OF VISIT
[FreeTextEntry3] :  I, Dr. Cardenas personally performed the evaluation and management services for this established patient who presents today with (a) new problem(s)/exacerbation of (an) existing condition(s). That E/M includes conducting the clinically appropriate interval history &/or exam, assessing all new/exacerbated conditions, and establishing a new plan of care. Today, my YUE, Jia Cannon was here to observe my evaluation and management service for this new problem/exacerbated condition and follow the plan of care established by me going forward.

## 2024-07-26 NOTE — HISTORY OF PRESENT ILLNESS
[___ Weeks Post Op] : [unfilled] weeks post op [5] : the patient reports pain that is 5/10 in severity [Chills] : no chills [Constipation] : no constipation [Diarrhea] : no diarrhea [Dysuria] : no dysuria [Fever] : no fever [Nausea] : no nausea [Vomiting] : no vomiting [de-identified] : post op f/u s/p right carpal tunnel release and trigger finger release of right long finger (5/16/24). [de-identified] : 69 y/o female s/p right carpal tunnel release and trigger finger release of right long finger (5/16/24). Her pain is improving overall but she continues to have some stiffness. She has been taking diclofenac. no chills, no constipation, no diarrhea, no dysuria, no fever, no nausea and no vomiting. She also complains of chronic back pain.  [de-identified] : Right hand: Incision healing well. No drainage or erythema. neurovascularly intact pulses intact and equal bilaterally.  MSK: Lumbar spine: No tenderness to palpation.  No step-off, no deformity.   NEURO EXAM: Sensation Left L2  -  2/2            Left L3  -  2/2 Left L4  -  2/2 Left L5  -  2/2 Left S1  -  2/2   Right L2  -  2/2            Right L3  -  2/2 Right L4  -  2/2 Right L5  -  2/2 Right S1  -  2/2   Motor: Left L2 (hip flexion)                            5/5                Left L3 (knee extension)                   5/5                Left L4 (ankle dorsiflexion)                 5/5                Left L5 (long toe extensor)                5/5                Left S1 (ankle plantar flexion)           5/5   Right L2 (hip flexion)                            5/5                Right L3 (knee extension)                   5/5                Right L4 (ankle dorsiflexion)                 5/5                Right L5 (long toe extensor)                5/5                Right S1 (ankle plantar flexion)           5/5   Reflexes: Normal and symmetric Negative clonus.  Down-going Babinski.   [de-identified] : 67 y/o female s/p right carpal tunnel release and trigger finger release of right long finger (5/16/24). Her pain is improving overall but she continues to have some stiffness. She has been taking diclofenac. no chills, no constipation, no diarrhea, no dysuria, no fever, no nausea and no vomiting. She also complains of chronic back pain.  [de-identified] : Diclofenac PRN. The patient was given a referral for occupational therapy. The patient was given a referral for physical therapy. Start cyclobenzaprine. F/U in 4-6 weeks.

## 2024-08-19 ENCOUNTER — APPOINTMENT (OUTPATIENT)
Dept: ORTHOPEDIC SURGERY | Facility: CLINIC | Age: 69
End: 2024-08-19

## 2024-08-19 NOTE — HISTORY OF PRESENT ILLNESS
[de-identified] : ALLAN YOUNG is a 69 year old female presents to follow up Last visit was 05/23/2024

## 2024-08-19 NOTE — HISTORY OF PRESENT ILLNESS
[de-identified] : ALLAN YOUNG is a 69 year old female presents to follow up Last visit was 05/23/2024

## 2024-08-19 NOTE — ASSESSMENT
[FreeTextEntry1] : ALLAN YOUNG is a 69 year old female with     I discussed with the patient that their symptoms, signs, and imaging are most consistent with  **.  We reviewed the natural history of this condition and treatment options. We agreed on the following plan:  Encouraged to continue home exercises per handout. Continue physical therapy. Recommend 150 min per week of moderate intensity aerobic activity  Medication:    prescription provided. Imaging: Follow up in 6-8 weeks.

## 2024-08-23 ENCOUNTER — APPOINTMENT (OUTPATIENT)
Dept: ORTHOPEDIC SURGERY | Facility: CLINIC | Age: 69
End: 2024-08-23

## 2024-08-23 DIAGNOSIS — M48.061 SPINAL STENOSIS, LUMBAR REGION WITHOUT NEUROGENIC CLAUDICATION: ICD-10-CM

## 2024-08-23 PROCEDURE — 99214 OFFICE O/P EST MOD 30 MIN: CPT

## 2024-08-23 RX ORDER — CYCLOBENZAPRINE HYDROCHLORIDE 10 MG/1
10 TABLET, FILM COATED ORAL 3 TIMES DAILY
Qty: 42 | Refills: 1 | Status: ACTIVE | COMMUNITY
Start: 2024-08-23 | End: 1900-01-01

## 2024-08-23 RX ORDER — LIDOCAINE 5% 700 MG/1
5 PATCH TOPICAL
Qty: 1 | Refills: 2 | Status: ACTIVE | COMMUNITY
Start: 2024-08-23 | End: 1900-01-01

## 2024-08-23 RX ORDER — DICLOFENAC SODIUM 75 MG/1
75 TABLET, DELAYED RELEASE ORAL
Qty: 60 | Refills: 1 | Status: ACTIVE | COMMUNITY
Start: 2024-08-23 | End: 1900-01-01

## 2024-08-23 NOTE — DISCUSSION/SUMMARY
[de-identified] :  I, Dr. Cardenas personally performed the evaluation and management services for this established patient who presents today with (a) new problem(s)/exacerbation of (an) existing condition(s). That E/M includes conducting the clinically appropriate interval history &/or exam, assessing all new/exacerbated conditions, and establishing a new plan of care. Today, my YUE, Jia Cannon was here to observe my evaluation and management service for this new problem/exacerbated condition and follow the plan of care established by me going forward.

## 2024-08-23 NOTE — PHYSICAL EXAM
[de-identified] :  General: No acute distress, conversant, well-nourished. Head: Normocephalic, atraumatic Neck: trachea midline, FROM Heart: normotensive and normal rate and rhythm Lungs: No labored breathing Skin: No abrasions, no rashes, no edema Psych: Alert and oriented to person, place and time Extremities: no peripheral edema or digital cyanosis Gait: Normal gait. Can perform tandem gait.  Vascular: warm and well perfused distally, palpable distal pulses   MSK: Lumbar spine: + tenderness to palpation.  No step-off, no deformity.   NEURO EXAM: Sensation Left L2  -  2/2            Left L3  -  2/2 Left L4  -  2/2 Left L5  -  2/2 Left S1  -  2/2   Right L2  -  2/2            Right L3  -  2/2 Right L4  -  2/2 Right L5  -  2/2 Right S1  -  2/2   Motor: Left L2 (hip flexion)                            5/5                Left L3 (knee extension)                   5/5                Left L4 (ankle dorsiflexion)                 5/5                Left L5 (long toe extensor)                5/5                Left S1 (ankle plantar flexion)           5/5   Right L2 (hip flexion)                            5/5                Right L3 (knee extension)                   5/5                Right L4 (ankle dorsiflexion)                 5/5                Right L5 (long toe extensor)                5/5                Right S1 (ankle plantar flexion)           5/5   Reflexes: Normal and symmetric Negative clonus.  Down-going Babinski.

## 2024-08-23 NOTE — DISCUSSION/SUMMARY
[de-identified] :  I, Dr. Cardenas personally performed the evaluation and management services for this established patient who presents today with (a) new problem(s)/exacerbation of (an) existing condition(s). That E/M includes conducting the clinically appropriate interval history &/or exam, assessing all new/exacerbated conditions, and establishing a new plan of care. Today, my YUE, Jia Cannon was here to observe my evaluation and management service for this new problem/exacerbated condition and follow the plan of care established by me going forward.

## 2024-08-23 NOTE — PHYSICAL EXAM
[de-identified] :  General: No acute distress, conversant, well-nourished. Head: Normocephalic, atraumatic Neck: trachea midline, FROM Heart: normotensive and normal rate and rhythm Lungs: No labored breathing Skin: No abrasions, no rashes, no edema Psych: Alert and oriented to person, place and time Extremities: no peripheral edema or digital cyanosis Gait: Normal gait. Can perform tandem gait.  Vascular: warm and well perfused distally, palpable distal pulses   MSK: Lumbar spine: + tenderness to palpation.  No step-off, no deformity.   NEURO EXAM: Sensation Left L2  -  2/2            Left L3  -  2/2 Left L4  -  2/2 Left L5  -  2/2 Left S1  -  2/2   Right L2  -  2/2            Right L3  -  2/2 Right L4  -  2/2 Right L5  -  2/2 Right S1  -  2/2   Motor: Left L2 (hip flexion)                            5/5                Left L3 (knee extension)                   5/5                Left L4 (ankle dorsiflexion)                 5/5                Left L5 (long toe extensor)                5/5                Left S1 (ankle plantar flexion)           5/5   Right L2 (hip flexion)                            5/5                Right L3 (knee extension)                   5/5                Right L4 (ankle dorsiflexion)                 5/5                Right L5 (long toe extensor)                5/5                Right S1 (ankle plantar flexion)           5/5   Reflexes: Normal and symmetric Negative clonus.  Down-going Babinski.

## 2024-08-23 NOTE — ASSESSMENT
[FreeTextEntry1] : 68 y/o female presenting for follow up for chronic lower back pain. The pain is localized to her lower back. She is taking diclofenac and cyclobenzaprine with relief. She is doing PT. She wants to avoid injections. She is planning to have right THR soon. denies radicular pain, recent illness, fevers, numbness, weakness, balance problems, saddle anesthesia, urinary retention or fecal incontinence. We discussed treatment options including medications, PT, and spinal injections. Patient would like to avoid injections. Continue diclofenac. Continue cyclobenzaprine. Start lidocaine patches. Continue PT. F/U in 6-8 weeks. We discussed red flag symptoms that would require emergent evaluation. She knows to call with any questions or concerns or if her symptoms acutely worsen.

## 2024-08-23 NOTE — HISTORY OF PRESENT ILLNESS
[Chills] : no chills [Fever] : no fever [Nausea] : no nausea [Vomiting] : no vomiting [Clean/Dry/Intact] : clean, dry and intact [Erythema] : not erythematous [Doing Well] : is doing well [Excellent Pain Control] : has excellent pain control [No Sign of Infection] : is showing no signs of infection [None] : None [de-identified] : 68 y/o female presenting for follow up for chronic lower back pain. The pain is localized to her lower back. She is taking diclofenac and cyclobenzaprine with relief. She is doing PT. She wants to avoid injections. She is planning to have right THR soon. denies radicular pain, recent illness, fevers, numbness, weakness, balance problems, saddle anesthesia, urinary retention or fecal incontinence. A [place] video  was used for the entire encounter.

## 2024-08-23 NOTE — HISTORY OF PRESENT ILLNESS
[Chills] : no chills [Fever] : no fever [Nausea] : no nausea [Vomiting] : no vomiting [Clean/Dry/Intact] : clean, dry and intact [Erythema] : not erythematous [Doing Well] : is doing well [Excellent Pain Control] : has excellent pain control [No Sign of Infection] : is showing no signs of infection [None] : None [de-identified] : 68 y/o female presenting for follow up for chronic lower back pain. The pain is localized to her lower back. She is taking diclofenac and cyclobenzaprine with relief. She is doing PT. She wants to avoid injections. She is planning to have right THR soon. denies radicular pain, recent illness, fevers, numbness, weakness, balance problems, saddle anesthesia, urinary retention or fecal incontinence. A [place] video  was used for the entire encounter.

## 2024-10-23 ENCOUNTER — APPOINTMENT (OUTPATIENT)
Dept: ORTHOPEDIC SURGERY | Facility: CLINIC | Age: 69
End: 2024-10-23
Payer: MEDICARE

## 2024-10-23 DIAGNOSIS — M54.50 LOW BACK PAIN, UNSPECIFIED: ICD-10-CM

## 2024-10-23 DIAGNOSIS — M25.561 PAIN IN RIGHT KNEE: ICD-10-CM

## 2024-10-23 PROCEDURE — 99214 OFFICE O/P EST MOD 30 MIN: CPT

## 2024-10-23 PROCEDURE — 73562 X-RAY EXAM OF KNEE 3: CPT | Mod: RT

## 2024-10-23 RX ORDER — DICLOFENAC SODIUM 75 MG/1
75 TABLET, DELAYED RELEASE ORAL
Qty: 60 | Refills: 1 | Status: ACTIVE | COMMUNITY
Start: 2024-10-23 | End: 1900-01-01

## 2024-10-23 RX ORDER — CYCLOBENZAPRINE HYDROCHLORIDE 10 MG/1
10 TABLET, FILM COATED ORAL 3 TIMES DAILY
Qty: 90 | Refills: 1 | Status: ACTIVE | COMMUNITY
Start: 2024-10-23 | End: 1900-01-01

## 2024-11-14 ENCOUNTER — APPOINTMENT (OUTPATIENT)
Dept: NEUROLOGY | Facility: CLINIC | Age: 69
End: 2024-11-14
Payer: MEDICARE

## 2024-11-14 ENCOUNTER — NON-APPOINTMENT (OUTPATIENT)
Age: 69
End: 2024-11-14

## 2024-11-14 VITALS
WEIGHT: 166.4 LBS | SYSTOLIC BLOOD PRESSURE: 104 MMHG | DIASTOLIC BLOOD PRESSURE: 64 MMHG | BODY MASS INDEX: 36.01 KG/M2 | HEART RATE: 90 BPM | TEMPERATURE: 98 F | OXYGEN SATURATION: 95 %

## 2024-11-14 PROCEDURE — 99214 OFFICE O/P EST MOD 30 MIN: CPT

## 2024-11-14 PROCEDURE — G2211 COMPLEX E/M VISIT ADD ON: CPT

## 2024-11-14 RX ORDER — ATORVASTATIN CALCIUM 40 MG/1
40 TABLET, FILM COATED ORAL DAILY
Refills: 0 | Status: ACTIVE | COMMUNITY

## 2024-11-14 RX ORDER — MECLIZINE HYDROCHLORIDE 25 MG/1
25 TABLET ORAL
Refills: 0 | Status: ACTIVE | COMMUNITY

## 2024-11-21 NOTE — PROCEDURE
[FreeTextEntry1] : Nerve Conduction and Electromyography Report [FreeTextEntry3] : Electro Physiologic Findings:\par \par Limb temperature was monitored and maintained at approximately 30 – 34° C in the lower extremities. \par \par The left sural and superficial fibular responses were normal. The right superficial fibular response was within normal limits but lower amplitude than the contralateral side, likely due to an osteophyte on the anterior dorsal foot. \par \par The fibular and tibial motor responses, including F-wave latencies, were normal bilaterally. \par \par Needle electromyography was performed on select left lower extremity L2-S2 appendicular muscles and the left L4/5 paraspinal. All muscles tested were unremarkable without evidence of active or chronic denervation, although many were suboptimally evaluated due to poor effort. \par \par Clinical Electrophysiological Impression: \par \par This electrodiagnostic study was unremarkable. There was no evidence of polyneuropathy, myopathy, or left lumbar radiculopathy.  no

## 2024-12-04 ENCOUNTER — APPOINTMENT (OUTPATIENT)
Dept: ORTHOPEDIC SURGERY | Facility: CLINIC | Age: 69
End: 2024-12-04
Payer: MEDICARE

## 2024-12-04 DIAGNOSIS — M54.50 LOW BACK PAIN, UNSPECIFIED: ICD-10-CM

## 2024-12-04 DIAGNOSIS — M48.061 SPINAL STENOSIS, LUMBAR REGION WITHOUT NEUROGENIC CLAUDICATION: ICD-10-CM

## 2024-12-04 DIAGNOSIS — G56.01 CARPAL TUNNEL SYNDROME, RIGHT UPPER LIMB: ICD-10-CM

## 2024-12-04 PROCEDURE — 99214 OFFICE O/P EST MOD 30 MIN: CPT

## 2024-12-04 RX ORDER — DICLOFENAC SODIUM 10 MG/G
1 GEL TOPICAL
Qty: 1 | Refills: 2 | Status: ACTIVE | COMMUNITY
Start: 2024-12-04 | End: 1900-01-01

## 2024-12-04 RX ORDER — CYCLOBENZAPRINE HYDROCHLORIDE 10 MG/1
10 TABLET, FILM COATED ORAL 3 TIMES DAILY
Qty: 90 | Refills: 1 | Status: ACTIVE | COMMUNITY
Start: 2024-12-04 | End: 1900-01-01

## 2024-12-04 RX ORDER — HYALURONATE SODIUM 10 MG/ML
25 SYRINGE (ML) INTRAARTICULAR WEEKLY
Qty: 3 | Refills: 0 | Status: ACTIVE | OUTPATIENT
Start: 2024-12-04

## 2024-12-04 RX ORDER — DICLOFENAC SODIUM 75 MG/1
75 TABLET, DELAYED RELEASE ORAL
Qty: 60 | Refills: 1 | Status: ACTIVE | COMMUNITY
Start: 2024-12-04 | End: 1900-01-01

## 2024-12-09 RX ORDER — HYALURONATE SODIUM 30 MG/2 ML
30 SYRINGE (ML) INTRAARTICULAR
Qty: 3 | Refills: 0 | Status: ACTIVE | OUTPATIENT
Start: 2024-12-09

## 2024-12-24 ENCOUNTER — APPOINTMENT (OUTPATIENT)
Dept: ORTHOPEDIC SURGERY | Facility: CLINIC | Age: 69
End: 2024-12-24

## 2024-12-24 PROCEDURE — 20610 DRAIN/INJ JOINT/BURSA W/O US: CPT | Mod: RT

## 2024-12-31 ENCOUNTER — APPOINTMENT (OUTPATIENT)
Dept: ORTHOPEDIC SURGERY | Facility: CLINIC | Age: 69
End: 2024-12-31
Payer: MEDICARE

## 2024-12-31 DIAGNOSIS — M17.0 BILATERAL PRIMARY OSTEOARTHRITIS OF KNEE: ICD-10-CM

## 2024-12-31 PROCEDURE — 20610 DRAIN/INJ JOINT/BURSA W/O US: CPT | Mod: RT

## 2025-01-03 ENCOUNTER — APPOINTMENT (OUTPATIENT)
Dept: ORTHOPEDIC SURGERY | Facility: CLINIC | Age: 70
End: 2025-01-03

## 2025-01-12 PROBLEM — M25.561 KNEE PAIN, RIGHT: Status: ACTIVE | Noted: 2025-01-12

## 2025-01-12 PROBLEM — M25.561 KNEE PAIN, RIGHT: Status: RESOLVED | Noted: 2024-10-23 | Resolved: 2025-01-12

## 2025-01-15 ENCOUNTER — APPOINTMENT (OUTPATIENT)
Dept: ORTHOPEDIC SURGERY | Facility: CLINIC | Age: 70
End: 2025-01-15
Payer: MEDICARE

## 2025-01-15 DIAGNOSIS — M25.561 PAIN IN RIGHT KNEE: ICD-10-CM

## 2025-01-15 PROCEDURE — 20610 DRAIN/INJ JOINT/BURSA W/O US: CPT | Mod: RT

## 2025-01-15 RX ORDER — CYCLOBENZAPRINE HYDROCHLORIDE 10 MG/1
10 TABLET, FILM COATED ORAL 3 TIMES DAILY
Qty: 90 | Refills: 1 | Status: ACTIVE | COMMUNITY
Start: 2025-01-15 | End: 1900-01-01

## 2025-02-05 ENCOUNTER — APPOINTMENT (OUTPATIENT)
Dept: ORTHOPEDIC SURGERY | Facility: CLINIC | Age: 70
End: 2025-02-05
Payer: MEDICARE

## 2025-02-05 DIAGNOSIS — M48.061 SPINAL STENOSIS, LUMBAR REGION WITHOUT NEUROGENIC CLAUDICATION: ICD-10-CM

## 2025-02-05 DIAGNOSIS — M25.561 PAIN IN RIGHT KNEE: ICD-10-CM

## 2025-02-05 PROCEDURE — 99214 OFFICE O/P EST MOD 30 MIN: CPT

## 2025-02-05 RX ORDER — CYCLOBENZAPRINE HYDROCHLORIDE 10 MG/1
10 TABLET, FILM COATED ORAL 3 TIMES DAILY
Qty: 90 | Refills: 1 | Status: ACTIVE | COMMUNITY
Start: 2025-02-05 | End: 1900-01-01

## 2025-02-10 ENCOUNTER — APPOINTMENT (OUTPATIENT)
Dept: ORTHOPEDIC SURGERY | Facility: CLINIC | Age: 70
End: 2025-02-10
Payer: MEDICARE

## 2025-02-10 ENCOUNTER — NON-APPOINTMENT (OUTPATIENT)
Age: 70
End: 2025-02-10

## 2025-02-10 ENCOUNTER — RESULT REVIEW (OUTPATIENT)
Age: 70
End: 2025-02-10

## 2025-02-10 ENCOUNTER — OUTPATIENT (OUTPATIENT)
Dept: OUTPATIENT SERVICES | Facility: HOSPITAL | Age: 70
LOS: 1 days | End: 2025-02-10
Payer: MEDICARE

## 2025-02-10 VITALS
HEIGHT: 57 IN | OXYGEN SATURATION: 100 % | HEART RATE: 80 BPM | WEIGHT: 163 LBS | DIASTOLIC BLOOD PRESSURE: 67 MMHG | BODY MASS INDEX: 35.17 KG/M2 | SYSTOLIC BLOOD PRESSURE: 106 MMHG

## 2025-02-10 DIAGNOSIS — Z47.1 AFTERCARE FOLLOWING JOINT REPLACEMENT SURGERY: ICD-10-CM

## 2025-02-10 DIAGNOSIS — M16.11 UNILATERAL PRIMARY OSTEOARTHRITIS, RIGHT HIP: ICD-10-CM

## 2025-02-10 DIAGNOSIS — Z96.642 PRESENCE OF LEFT ARTIFICIAL HIP JOINT: ICD-10-CM

## 2025-02-10 DIAGNOSIS — Z98.891 HISTORY OF UTERINE SCAR FROM PREVIOUS SURGERY: Chronic | ICD-10-CM

## 2025-02-10 DIAGNOSIS — Z96.642 AFTERCARE FOLLOWING JOINT REPLACEMENT SURGERY: ICD-10-CM

## 2025-02-10 DIAGNOSIS — T84.84XD PAIN DUE TO INTERNAL ORTHOPEDIC PROSTHETIC DEVICES, IMPLANTS AND GRAFTS, SUBSEQUENT ENCOUNTER: ICD-10-CM

## 2025-02-10 DIAGNOSIS — Z96.642 PRESENCE OF LEFT ARTIFICIAL HIP JOINT: Chronic | ICD-10-CM

## 2025-02-10 DIAGNOSIS — M76.892 OTHER SPECIFIED ENTHESOPATHIES OF LEFT LOWER LIMB, EXCLUDING FOOT: ICD-10-CM

## 2025-02-10 DIAGNOSIS — Z96.649 PAIN DUE TO INTERNAL ORTHOPEDIC PROSTHETIC DEVICES, IMPLANTS AND GRAFTS, SUBSEQUENT ENCOUNTER: ICD-10-CM

## 2025-02-10 PROCEDURE — 99211 OFF/OP EST MAY X REQ PHY/QHP: CPT

## 2025-02-10 PROCEDURE — 73521 X-RAY EXAM HIPS BI 2 VIEWS: CPT

## 2025-02-10 PROCEDURE — 73521 X-RAY EXAM HIPS BI 2 VIEWS: CPT | Mod: 26

## 2025-02-17 PROBLEM — M16.11 PRIMARY OSTEOARTHRITIS OF RIGHT HIP: Noted: 2023-06-02

## 2025-02-17 PROBLEM — Z96.642 STATUS POST LEFT HIP REPLACEMENT: Noted: 2024-03-01

## 2025-02-17 PROBLEM — M76.892 TENDINITIS OF LEFT HIP FLEXOR: Noted: 2019-09-12

## 2025-02-26 ENCOUNTER — APPOINTMENT (OUTPATIENT)
Dept: ORTHOPEDIC SURGERY | Facility: CLINIC | Age: 70
End: 2025-02-26

## 2025-02-26 PROCEDURE — 99214 OFFICE O/P EST MOD 30 MIN: CPT

## 2025-02-26 RX ORDER — CYCLOBENZAPRINE HYDROCHLORIDE 10 MG/1
10 TABLET, FILM COATED ORAL 3 TIMES DAILY
Qty: 90 | Refills: 1 | Status: ACTIVE | COMMUNITY
Start: 2025-02-26 | End: 1900-01-01

## 2025-04-09 ENCOUNTER — APPOINTMENT (OUTPATIENT)
Dept: ORTHOPEDIC SURGERY | Facility: CLINIC | Age: 70
End: 2025-04-09

## 2025-04-09 PROCEDURE — 99214 OFFICE O/P EST MOD 30 MIN: CPT

## 2025-04-09 RX ORDER — CYCLOBENZAPRINE HYDROCHLORIDE 10 MG/1
10 TABLET, FILM COATED ORAL 3 TIMES DAILY
Qty: 90 | Refills: 1 | Status: ACTIVE | COMMUNITY
Start: 2025-04-09 | End: 1900-01-01

## 2025-04-30 ENCOUNTER — APPOINTMENT (OUTPATIENT)
Dept: ORTHOPEDIC SURGERY | Facility: CLINIC | Age: 70
End: 2025-04-30

## 2025-05-21 ENCOUNTER — APPOINTMENT (OUTPATIENT)
Dept: ORTHOPEDIC SURGERY | Facility: CLINIC | Age: 70
End: 2025-05-21
Payer: MEDICARE

## 2025-05-21 DIAGNOSIS — G89.29 DORSALGIA, UNSPECIFIED: ICD-10-CM

## 2025-05-21 DIAGNOSIS — M54.2 CERVICALGIA: ICD-10-CM

## 2025-05-21 DIAGNOSIS — M54.9 DORSALGIA, UNSPECIFIED: ICD-10-CM

## 2025-05-21 DIAGNOSIS — M17.0 BILATERAL PRIMARY OSTEOARTHRITIS OF KNEE: ICD-10-CM

## 2025-05-21 PROCEDURE — 99214 OFFICE O/P EST MOD 30 MIN: CPT

## 2025-05-21 RX ORDER — DICLOFENAC SODIUM 75 MG/1
75 TABLET, DELAYED RELEASE ORAL
Qty: 180 | Refills: 1 | Status: ACTIVE | COMMUNITY
Start: 2025-05-21 | End: 1900-01-01

## 2025-05-21 RX ORDER — CYCLOBENZAPRINE 10 MG/1
10 TABLET ORAL
Qty: 90 | Refills: 1 | Status: ACTIVE | COMMUNITY
Start: 2025-05-21 | End: 1900-01-01

## 2025-05-29 ENCOUNTER — APPOINTMENT (OUTPATIENT)
Dept: ORTHOPEDIC SURGERY | Facility: CLINIC | Age: 70
End: 2025-05-29
Payer: MEDICARE

## 2025-05-29 DIAGNOSIS — M25.551 PAIN IN RIGHT HIP: ICD-10-CM

## 2025-05-29 PROCEDURE — 73521 X-RAY EXAM HIPS BI 2 VIEWS: CPT

## 2025-05-29 PROCEDURE — 99214 OFFICE O/P EST MOD 30 MIN: CPT

## 2025-07-03 ENCOUNTER — APPOINTMENT (OUTPATIENT)
Dept: ORTHOPEDIC SURGERY | Facility: CLINIC | Age: 70
End: 2025-07-03

## 2025-07-03 PROCEDURE — 99214 OFFICE O/P EST MOD 30 MIN: CPT

## 2025-07-03 RX ORDER — CYCLOBENZAPRINE 10 MG/1
10 TABLET ORAL 3 TIMES DAILY
Qty: 90 | Refills: 1 | Status: ACTIVE | COMMUNITY
Start: 2025-07-03 | End: 1900-01-01

## 2025-07-03 RX ORDER — HYALURONATE SODIUM 10 MG/ML
25 SYRINGE (ML) INTRAARTICULAR
Qty: 1 | Refills: 0 | Status: ACTIVE | OUTPATIENT
Start: 2025-07-03

## 2025-07-08 RX ORDER — HYALURONATE SODIUM 30 MG/2 ML
30 SYRINGE (ML) INTRAARTICULAR
Qty: 3 | Refills: 0 | Status: ACTIVE | OUTPATIENT
Start: 2025-07-08

## 2025-07-18 ENCOUNTER — APPOINTMENT (OUTPATIENT)
Dept: ORTHOPEDIC SURGERY | Facility: CLINIC | Age: 70
End: 2025-07-18

## 2025-07-18 PROCEDURE — 20610 DRAIN/INJ JOINT/BURSA W/O US: CPT | Mod: RT

## 2025-07-29 ENCOUNTER — APPOINTMENT (OUTPATIENT)
Dept: ORTHOPEDIC SURGERY | Facility: CLINIC | Age: 70
End: 2025-07-29

## 2025-07-29 PROCEDURE — 20610 DRAIN/INJ JOINT/BURSA W/O US: CPT | Mod: RT

## 2025-07-29 RX ORDER — NAPROXEN 500 MG/1
500 TABLET ORAL
Qty: 60 | Refills: 1 | Status: ACTIVE | COMMUNITY
Start: 2025-07-29 | End: 1900-01-01

## 2025-08-13 ENCOUNTER — APPOINTMENT (OUTPATIENT)
Dept: ORTHOPEDIC SURGERY | Facility: CLINIC | Age: 70
End: 2025-08-13

## 2025-08-13 DIAGNOSIS — M25.561 PAIN IN RIGHT KNEE: ICD-10-CM

## 2025-08-13 PROCEDURE — 20610 DRAIN/INJ JOINT/BURSA W/O US: CPT | Mod: RT

## 2025-08-13 RX ORDER — MELOXICAM 15 MG/1
15 TABLET ORAL
Qty: 90 | Refills: 0 | Status: ACTIVE | COMMUNITY
Start: 2025-08-13 | End: 1900-01-01

## (undated) DEVICE — PACK HAND

## (undated) DEVICE — SUT MONOCRYL 5-0 18" P-3 UNDYED

## (undated) DEVICE — GLV 8.5 PROTEXIS (WHITE)

## (undated) DEVICE — TOURNIQUET CUFF 18" DUAL PORT SINGLE BLADDER W PLC  (BLACK)

## (undated) DEVICE — WARMING BLANKET LOWER ADULT

## (undated) DEVICE — MARKING PEN W RULER

## (undated) DEVICE — GLV 8 PROTEXIS (WHITE)

## (undated) DEVICE — VENODYNE/SCD SLEEVE CALF MEDIUM